# Patient Record
Sex: MALE | Race: WHITE | NOT HISPANIC OR LATINO
[De-identification: names, ages, dates, MRNs, and addresses within clinical notes are randomized per-mention and may not be internally consistent; named-entity substitution may affect disease eponyms.]

---

## 2017-03-01 ENCOUNTER — MEDICATION RENEWAL (OUTPATIENT)
Age: 47
End: 2017-03-01

## 2017-04-03 ENCOUNTER — APPOINTMENT (OUTPATIENT)
Dept: CARDIOLOGY | Facility: CLINIC | Age: 47
End: 2017-04-03

## 2017-04-03 VITALS
DIASTOLIC BLOOD PRESSURE: 80 MMHG | WEIGHT: 259 LBS | HEIGHT: 69 IN | SYSTOLIC BLOOD PRESSURE: 142 MMHG | BODY MASS INDEX: 38.36 KG/M2 | HEART RATE: 88 BPM

## 2017-05-22 ENCOUNTER — APPOINTMENT (OUTPATIENT)
Dept: CARDIOLOGY | Facility: CLINIC | Age: 47
End: 2017-05-22

## 2017-10-02 ENCOUNTER — APPOINTMENT (OUTPATIENT)
Dept: CARDIOLOGY | Facility: CLINIC | Age: 47
End: 2017-10-02

## 2017-10-02 VITALS
DIASTOLIC BLOOD PRESSURE: 80 MMHG | HEART RATE: 87 BPM | WEIGHT: 250 LBS | HEIGHT: 69 IN | BODY MASS INDEX: 37.03 KG/M2 | SYSTOLIC BLOOD PRESSURE: 120 MMHG

## 2018-02-26 ENCOUNTER — RX RENEWAL (OUTPATIENT)
Age: 48
End: 2018-02-26

## 2018-04-23 ENCOUNTER — OTHER (OUTPATIENT)
Age: 48
End: 2018-04-23

## 2018-04-23 ENCOUNTER — RESULT CHARGE (OUTPATIENT)
Age: 48
End: 2018-04-23

## 2018-04-23 ENCOUNTER — APPOINTMENT (OUTPATIENT)
Dept: CARDIOLOGY | Facility: CLINIC | Age: 48
End: 2018-04-23

## 2018-10-22 ENCOUNTER — OTHER (OUTPATIENT)
Age: 48
End: 2018-10-22

## 2018-10-22 ENCOUNTER — RESULT CHARGE (OUTPATIENT)
Age: 48
End: 2018-10-22

## 2018-10-22 ENCOUNTER — APPOINTMENT (OUTPATIENT)
Dept: CARDIOLOGY | Facility: CLINIC | Age: 48
End: 2018-10-22

## 2018-10-22 VITALS
DIASTOLIC BLOOD PRESSURE: 88 MMHG | SYSTOLIC BLOOD PRESSURE: 130 MMHG | HEIGHT: 69 IN | BODY MASS INDEX: 38.51 KG/M2 | WEIGHT: 260 LBS | HEART RATE: 90 BPM

## 2019-04-01 ENCOUNTER — APPOINTMENT (OUTPATIENT)
Dept: CARDIOLOGY | Facility: CLINIC | Age: 49
End: 2019-04-01
Payer: COMMERCIAL

## 2019-04-01 VITALS
HEIGHT: 69 IN | WEIGHT: 270 LBS | DIASTOLIC BLOOD PRESSURE: 80 MMHG | SYSTOLIC BLOOD PRESSURE: 134 MMHG | HEART RATE: 97 BPM | BODY MASS INDEX: 39.99 KG/M2

## 2019-04-01 DIAGNOSIS — I65.29 OCCLUSION AND STENOSIS OF UNSPECIFIED CAROTID ARTERY: ICD-10-CM

## 2019-04-01 PROCEDURE — 93880 EXTRACRANIAL BILAT STUDY: CPT

## 2019-04-01 PROCEDURE — 99214 OFFICE O/P EST MOD 30 MIN: CPT

## 2019-04-01 PROCEDURE — 93000 ELECTROCARDIOGRAM COMPLETE: CPT

## 2019-04-01 RX ORDER — ZINC OXIDE 13 %
CREAM (GRAM) TOPICAL DAILY
Refills: 0 | Status: ACTIVE | COMMUNITY

## 2019-04-01 NOTE — ASSESSMENT
[FreeTextEntry1] : CAD, s/p PCI of LAD with DESIREE.\par Had a sleep test - diagnosed with GEO. Using CPAP, but does not feel refreshed in the morning.\par No angina.\par CT chest noted.\par Diet, pat loss, smoking cessation discussed.\par Secondary prevention discussed.\par Labs reviewed.\par No changes in medical therapy at this time.\par Stress test - schedule.\par Carotid Doppler - f/u results.\par D/w patient and wife in detail\par F/u in 6 months, or if any symptoms.

## 2019-04-01 NOTE — REASON FOR VISIT
[Follow-Up - Clinic] : a clinic follow-up of [Coronary Artery Disease] : coronary artery disease [FreeTextEntry1] : 47 y/o male with history of HTN, DL, CAD, s/p PCI of LAD in February of 2014, presenting for follow-up. The patient reports no chest pain, dyspnea, orthopnea or PND. Unable to loose weight.  Still smokes, but is trying to cut down. Was unable to quit. He had labs done with Dr. Roth - good control of LDL, HDL is still low. Carotid - no obstructive disease. On medical therapy for DM, DL.

## 2019-04-01 NOTE — PHYSICAL EXAM
[General Appearance - Well Developed] : well developed [General Appearance - Well Nourished] : well nourished [General Appearance - In No Acute Distress] : no acute distress [Normal Conjunctiva] : the conjunctiva exhibited no abnormalities [Normal Oral Mucosa] : normal oral mucosa [Normal Oropharynx] : normal oropharynx [Normal Jugular Venous V Waves Present] : normal jugular venous V waves present [] : no respiratory distress [Respiration, Rhythm And Depth] : normal respiratory rhythm and effort [Auscultation Breath Sounds / Voice Sounds] : lungs were clear to auscultation bilaterally [Heart Rate And Rhythm] : heart rate and rhythm were normal [Heart Sounds] : normal S1 and S2 [Murmurs] : no murmurs present [Arterial Pulses Normal] : the arterial pulses were normal [Edema] : no peripheral edema present [Bowel Sounds] : normal bowel sounds [Abdomen Soft] : soft [Abdomen Tenderness] : non-tender [Abnormal Walk] : normal gait [Nail Clubbing] : no clubbing of the fingernails [Cyanosis, Localized] : no localized cyanosis [Skin Color & Pigmentation] : normal skin color and pigmentation [Oriented To Time, Place, And Person] : oriented to person, place, and time [Affect] : the affect was normal [FreeTextEntry1] : overweight

## 2019-04-02 PROBLEM — I65.29 OCCLUSION AND STENOSIS OF UNSPECIFIED CAROTID ARTERY: Status: ACTIVE | Noted: 2019-04-02

## 2019-05-13 ENCOUNTER — APPOINTMENT (OUTPATIENT)
Dept: CARDIOLOGY | Facility: CLINIC | Age: 49
End: 2019-05-13
Payer: COMMERCIAL

## 2019-05-13 PROCEDURE — 93015 CV STRESS TEST SUPVJ I&R: CPT

## 2019-10-21 ENCOUNTER — APPOINTMENT (OUTPATIENT)
Dept: CARDIOLOGY | Facility: CLINIC | Age: 49
End: 2019-10-21
Payer: COMMERCIAL

## 2019-10-21 VITALS
HEIGHT: 69 IN | WEIGHT: 274 LBS | SYSTOLIC BLOOD PRESSURE: 128 MMHG | DIASTOLIC BLOOD PRESSURE: 86 MMHG | HEART RATE: 101 BPM | BODY MASS INDEX: 40.58 KG/M2

## 2019-10-21 PROCEDURE — 93000 ELECTROCARDIOGRAM COMPLETE: CPT

## 2019-10-21 PROCEDURE — 99214 OFFICE O/P EST MOD 30 MIN: CPT

## 2019-10-21 NOTE — ASSESSMENT
[FreeTextEntry1] : CAD, s/p PCI of LAD with DESIREE.\par Had a sleep test - diagnosed with EGO. Using CPAP.\par No angina.\par CT chest noted. Follow-up with pulmonary, ENT.\par Diet, pat loss, smoking cessation discussed.\par Secondary prevention discussed.\par Labs reviewed.\par No changes in medical therapy at this time.\par Stress test - no ischemia at 9 min\par D/w patient and wife in detail\par F/u in 6 months, or if any symptoms.

## 2019-10-21 NOTE — REASON FOR VISIT
[Follow-Up - Clinic] : a clinic follow-up of [FreeTextEntry1] : 48 y/o male with history of HTN, DL, CAD, s/p PCI of LAD in February of 2014, presenting for follow-up. The patient reports no chest pain, does have some dyspnea, no orthopnea or PND. Unable to loose weight, actually gained.  Still smokes, but is trying to cut down. Was unable to quit. He had labs done with Dr. Roth. Was seen by pulmonary, was diagnosed with GEO, started on CPAP. Also has interstitial lung disease on CT chest, being w/u for interstitial pneumonitis. Following with pulmonary, has appointment with ENT for possible T&A. Will have a trial of steroids for his lung disease. [Coronary Artery Disease] : coronary artery disease

## 2019-10-21 NOTE — PHYSICAL EXAM
[General Appearance - Well Developed] : well developed [General Appearance - Well Nourished] : well nourished [General Appearance - In No Acute Distress] : no acute distress [FreeTextEntry1] : overweight [Normal Jugular Venous V Waves Present] : normal jugular venous V waves present [Normal Oropharynx] : normal oropharynx [Normal Oral Mucosa] : normal oral mucosa [Normal Conjunctiva] : the conjunctiva exhibited no abnormalities [Murmurs] : no murmurs present [Heart Sounds] : normal S1 and S2 [Heart Rate And Rhythm] : heart rate and rhythm were normal [] : no respiratory distress [Edema] : no peripheral edema present [Arterial Pulses Normal] : the arterial pulses were normal [Auscultation Breath Sounds / Voice Sounds] : lungs were clear to auscultation bilaterally [Respiration, Rhythm And Depth] : normal respiratory rhythm and effort [Bowel Sounds] : normal bowel sounds [Abdomen Soft] : soft [Abdomen Tenderness] : non-tender [Cyanosis, Localized] : no localized cyanosis [Nail Clubbing] : no clubbing of the fingernails [Abnormal Walk] : normal gait [Skin Color & Pigmentation] : normal skin color and pigmentation [Oriented To Time, Place, And Person] : oriented to person, place, and time [Affect] : the affect was normal

## 2020-06-15 ENCOUNTER — APPOINTMENT (OUTPATIENT)
Dept: CARDIOLOGY | Facility: CLINIC | Age: 50
End: 2020-06-15
Payer: COMMERCIAL

## 2020-06-15 VITALS
BODY MASS INDEX: 41.92 KG/M2 | HEART RATE: 89 BPM | WEIGHT: 283 LBS | DIASTOLIC BLOOD PRESSURE: 82 MMHG | HEIGHT: 69 IN | SYSTOLIC BLOOD PRESSURE: 128 MMHG

## 2020-06-15 PROCEDURE — 93000 ELECTROCARDIOGRAM COMPLETE: CPT

## 2020-06-15 PROCEDURE — 99213 OFFICE O/P EST LOW 20 MIN: CPT

## 2020-06-15 NOTE — REASON FOR VISIT
[Follow-Up - Clinic] : a clinic follow-up of [Coronary Artery Disease] : coronary artery disease [FreeTextEntry1] : 48 y/o male with history of HTN, DL, CAD, s/p PCI of LAD in February of 2014, presenting for follow-up. The patient reports no chest pain, does have some dyspnea, no orthopnea or PND. Unable to loose weight, actually gained.  Still smokes, but is trying to cut down. Was unable to quit.  Was seen by pulmonary, was diagnosed with GEO, started on CPAP. Also has interstitial lung disease on CT chest, being w/u for interstitial pneumonitis. Following with pulmonary.

## 2020-06-15 NOTE — PHYSICAL EXAM
[General Appearance - Well Developed] : well developed [General Appearance - Well Nourished] : well nourished [General Appearance - In No Acute Distress] : no acute distress [FreeTextEntry1] : overweight [Normal Conjunctiva] : the conjunctiva exhibited no abnormalities [Normal Oral Mucosa] : normal oral mucosa [Normal Oropharynx] : normal oropharynx [Normal Jugular Venous V Waves Present] : normal jugular venous V waves present [] : no respiratory distress [Respiration, Rhythm And Depth] : normal respiratory rhythm and effort [Auscultation Breath Sounds / Voice Sounds] : lungs were clear to auscultation bilaterally [Heart Rate And Rhythm] : heart rate and rhythm were normal [Heart Sounds] : normal S1 and S2 [Murmurs] : no murmurs present [Arterial Pulses Normal] : the arterial pulses were normal [Edema] : no peripheral edema present [Bowel Sounds] : normal bowel sounds [Abdomen Soft] : soft [Abdomen Tenderness] : non-tender [Abnormal Walk] : normal gait [Nail Clubbing] : no clubbing of the fingernails [Cyanosis, Localized] : no localized cyanosis [Skin Color & Pigmentation] : normal skin color and pigmentation [Oriented To Time, Place, And Person] : oriented to person, place, and time [Affect] : the affect was normal

## 2020-06-15 NOTE — ASSESSMENT
[FreeTextEntry1] : CAD, s/p PCI of LAD with DESIREE.\par Had a sleep test - diagnosed with GEO. Using CPAP.\par No angina.\par CT chest noted. Follow-up with pulmonary, ENT.\par Diet, pat loss, smoking cessation discussed.\par Prescribe Chantix.\par Secondary prevention discussed.\par Labs reviewed.\par No changes in medical therapy at this time.\par Stress test - no ischemia at 9 min\par F/u in 6 months, or if any symptoms.

## 2020-06-21 ENCOUNTER — INPATIENT (INPATIENT)
Facility: HOSPITAL | Age: 50
LOS: 1 days | Discharge: HOME | End: 2020-06-23
Attending: INTERNAL MEDICINE | Admitting: INTERNAL MEDICINE
Payer: COMMERCIAL

## 2020-06-21 VITALS
DIASTOLIC BLOOD PRESSURE: 79 MMHG | HEART RATE: 78 BPM | HEIGHT: 69 IN | WEIGHT: 274.92 LBS | RESPIRATION RATE: 20 BRPM | TEMPERATURE: 99 F | SYSTOLIC BLOOD PRESSURE: 163 MMHG | OXYGEN SATURATION: 96 %

## 2020-06-21 DIAGNOSIS — I21.9 ACUTE MYOCARDIAL INFARCTION, UNSPECIFIED: Chronic | ICD-10-CM

## 2020-06-21 DIAGNOSIS — R09.89 OTHER SPECIFIED SYMPTOMS AND SIGNS INVOLVING THE CIRCULATORY AND RESPIRATORY SYSTEMS: ICD-10-CM

## 2020-06-21 DIAGNOSIS — Z90.49 ACQUIRED ABSENCE OF OTHER SPECIFIED PARTS OF DIGESTIVE TRACT: Chronic | ICD-10-CM

## 2020-06-21 DIAGNOSIS — Z95.5 PRESENCE OF CORONARY ANGIOPLASTY IMPLANT AND GRAFT: Chronic | ICD-10-CM

## 2020-06-21 LAB
ALBUMIN SERPL ELPH-MCNC: 4.3 G/DL — SIGNIFICANT CHANGE UP (ref 3.5–5.2)
ALP SERPL-CCNC: 139 U/L — HIGH (ref 30–115)
ALT FLD-CCNC: 67 U/L — HIGH (ref 0–41)
ANION GAP SERPL CALC-SCNC: 8 MMOL/L — SIGNIFICANT CHANGE UP (ref 7–14)
AST SERPL-CCNC: 65 U/L — HIGH (ref 0–41)
BASOPHILS # BLD AUTO: 0.06 K/UL — SIGNIFICANT CHANGE UP (ref 0–0.2)
BASOPHILS NFR BLD AUTO: 0.6 % — SIGNIFICANT CHANGE UP (ref 0–1)
BILIRUB SERPL-MCNC: 0.5 MG/DL — SIGNIFICANT CHANGE UP (ref 0.2–1.2)
BUN SERPL-MCNC: 13 MG/DL — SIGNIFICANT CHANGE UP (ref 10–20)
CALCIUM SERPL-MCNC: 9.8 MG/DL — SIGNIFICANT CHANGE UP (ref 8.5–10.1)
CHLORIDE SERPL-SCNC: 103 MMOL/L — SIGNIFICANT CHANGE UP (ref 98–110)
CK MB CFR SERPL CALC: 16.8 NG/ML — HIGH (ref 0.6–6.3)
CK SERPL-CCNC: 262 U/L — HIGH (ref 0–225)
CO2 SERPL-SCNC: 27 MMOL/L — SIGNIFICANT CHANGE UP (ref 17–32)
CREAT SERPL-MCNC: 0.8 MG/DL — SIGNIFICANT CHANGE UP (ref 0.7–1.5)
D DIMER BLD IA.RAPID-MCNC: 116 NG/ML DDU — SIGNIFICANT CHANGE UP (ref 0–230)
EOSINOPHIL # BLD AUTO: 0.24 K/UL — SIGNIFICANT CHANGE UP (ref 0–0.7)
EOSINOPHIL NFR BLD AUTO: 2.5 % — SIGNIFICANT CHANGE UP (ref 0–8)
GLUCOSE BLDC GLUCOMTR-MCNC: 142 MG/DL — HIGH (ref 70–99)
GLUCOSE SERPL-MCNC: 117 MG/DL — HIGH (ref 70–99)
HCT VFR BLD CALC: 46.2 % — SIGNIFICANT CHANGE UP (ref 42–52)
HGB BLD-MCNC: 15.6 G/DL — SIGNIFICANT CHANGE UP (ref 14–18)
IMM GRANULOCYTES NFR BLD AUTO: 0.3 % — SIGNIFICANT CHANGE UP (ref 0.1–0.3)
LYMPHOCYTES # BLD AUTO: 3.44 K/UL — HIGH (ref 1.2–3.4)
LYMPHOCYTES # BLD AUTO: 35.5 % — SIGNIFICANT CHANGE UP (ref 20.5–51.1)
MAGNESIUM SERPL-MCNC: 1.9 MG/DL — SIGNIFICANT CHANGE UP (ref 1.8–2.4)
MCHC RBC-ENTMCNC: 29.4 PG — SIGNIFICANT CHANGE UP (ref 27–31)
MCHC RBC-ENTMCNC: 33.8 G/DL — SIGNIFICANT CHANGE UP (ref 32–37)
MCV RBC AUTO: 87 FL — SIGNIFICANT CHANGE UP (ref 80–94)
MONOCYTES # BLD AUTO: 0.68 K/UL — HIGH (ref 0.1–0.6)
MONOCYTES NFR BLD AUTO: 7 % — SIGNIFICANT CHANGE UP (ref 1.7–9.3)
NEUTROPHILS # BLD AUTO: 5.23 K/UL — SIGNIFICANT CHANGE UP (ref 1.4–6.5)
NEUTROPHILS NFR BLD AUTO: 54.1 % — SIGNIFICANT CHANGE UP (ref 42.2–75.2)
NRBC # BLD: 0 /100 WBCS — SIGNIFICANT CHANGE UP (ref 0–0)
NT-PROBNP SERPL-SCNC: 42 PG/ML — SIGNIFICANT CHANGE UP (ref 0–300)
PLATELET # BLD AUTO: 249 K/UL — SIGNIFICANT CHANGE UP (ref 130–400)
POTASSIUM SERPL-MCNC: 4.5 MMOL/L — SIGNIFICANT CHANGE UP (ref 3.5–5)
POTASSIUM SERPL-SCNC: 4.5 MMOL/L — SIGNIFICANT CHANGE UP (ref 3.5–5)
PROT SERPL-MCNC: 6.2 G/DL — SIGNIFICANT CHANGE UP (ref 6–8)
RBC # BLD: 5.31 M/UL — SIGNIFICANT CHANGE UP (ref 4.7–6.1)
RBC # FLD: 13.1 % — SIGNIFICANT CHANGE UP (ref 11.5–14.5)
SARS-COV-2 RNA SPEC QL NAA+PROBE: SIGNIFICANT CHANGE UP
SODIUM SERPL-SCNC: 138 MMOL/L — SIGNIFICANT CHANGE UP (ref 135–146)
TROPONIN T SERPL-MCNC: 0.09 NG/ML — CRITICAL HIGH
TROPONIN T SERPL-MCNC: <0.01 NG/ML — SIGNIFICANT CHANGE UP
WBC # BLD: 9.68 K/UL — SIGNIFICANT CHANGE UP (ref 4.8–10.8)
WBC # FLD AUTO: 9.68 K/UL — SIGNIFICANT CHANGE UP (ref 4.8–10.8)

## 2020-06-21 PROCEDURE — 99223 1ST HOSP IP/OBS HIGH 75: CPT

## 2020-06-21 PROCEDURE — 93970 EXTREMITY STUDY: CPT | Mod: 26

## 2020-06-21 PROCEDURE — 71046 X-RAY EXAM CHEST 2 VIEWS: CPT | Mod: 26

## 2020-06-21 PROCEDURE — 76705 ECHO EXAM OF ABDOMEN: CPT | Mod: 26

## 2020-06-21 PROCEDURE — 99285 EMERGENCY DEPT VISIT HI MDM: CPT

## 2020-06-21 RX ORDER — VITAMIN E 100 UNIT
400 CAPSULE ORAL DAILY
Refills: 0 | Status: DISCONTINUED | OUTPATIENT
Start: 2020-06-21 | End: 2020-06-23

## 2020-06-21 RX ORDER — LORATADINE 10 MG/1
1 TABLET ORAL
Qty: 0 | Refills: 0 | DISCHARGE

## 2020-06-21 RX ORDER — METOPROLOL TARTRATE 50 MG
0 TABLET ORAL
Qty: 0 | Refills: 0 | DISCHARGE

## 2020-06-21 RX ORDER — NITROGLYCERIN 6.5 MG
0.4 CAPSULE, EXTENDED RELEASE ORAL ONCE
Refills: 0 | Status: COMPLETED | OUTPATIENT
Start: 2020-06-21 | End: 2020-06-21

## 2020-06-21 RX ORDER — ATORVASTATIN CALCIUM 80 MG/1
0 TABLET, FILM COATED ORAL
Qty: 0 | Refills: 0 | DISCHARGE

## 2020-06-21 RX ORDER — PANTOPRAZOLE SODIUM 20 MG/1
40 TABLET, DELAYED RELEASE ORAL
Refills: 0 | Status: DISCONTINUED | OUTPATIENT
Start: 2020-06-21 | End: 2020-06-23

## 2020-06-21 RX ORDER — METOPROLOL TARTRATE 50 MG
50 TABLET ORAL DAILY
Refills: 0 | Status: DISCONTINUED | OUTPATIENT
Start: 2020-06-21 | End: 2020-06-23

## 2020-06-21 RX ORDER — MORPHINE SULFATE 50 MG/1
2 CAPSULE, EXTENDED RELEASE ORAL ONCE
Refills: 0 | Status: DISCONTINUED | OUTPATIENT
Start: 2020-06-21 | End: 2020-06-21

## 2020-06-21 RX ORDER — HEPARIN SODIUM 5000 [USP'U]/ML
5000 INJECTION INTRAVENOUS; SUBCUTANEOUS ONCE
Refills: 0 | Status: COMPLETED | OUTPATIENT
Start: 2020-06-21 | End: 2020-06-21

## 2020-06-21 RX ORDER — ATORVASTATIN CALCIUM 80 MG/1
40 TABLET, FILM COATED ORAL AT BEDTIME
Refills: 0 | Status: DISCONTINUED | OUTPATIENT
Start: 2020-06-21 | End: 2020-06-23

## 2020-06-21 RX ORDER — METOPROLOL TARTRATE 50 MG
1 TABLET ORAL
Qty: 0 | Refills: 0 | DISCHARGE

## 2020-06-21 RX ORDER — NITROGLYCERIN 6.5 MG
5 CAPSULE, EXTENDED RELEASE ORAL
Qty: 50 | Refills: 0 | Status: DISCONTINUED | OUTPATIENT
Start: 2020-06-21 | End: 2020-06-22

## 2020-06-21 RX ORDER — CLOPIDOGREL BISULFATE 75 MG/1
300 TABLET, FILM COATED ORAL ONCE
Refills: 0 | Status: COMPLETED | OUTPATIENT
Start: 2020-06-21 | End: 2020-06-21

## 2020-06-21 RX ORDER — ASPIRIN/CALCIUM CARB/MAGNESIUM 324 MG
81 TABLET ORAL DAILY
Refills: 0 | Status: DISCONTINUED | OUTPATIENT
Start: 2020-06-21 | End: 2020-06-23

## 2020-06-21 RX ORDER — ATORVASTATIN CALCIUM 80 MG/1
1 TABLET, FILM COATED ORAL
Qty: 0 | Refills: 0 | DISCHARGE

## 2020-06-21 RX ORDER — CHLORHEXIDINE GLUCONATE 213 G/1000ML
1 SOLUTION TOPICAL EVERY 24 HOURS
Refills: 0 | Status: DISCONTINUED | OUTPATIENT
Start: 2020-06-21 | End: 2020-06-23

## 2020-06-21 RX ORDER — ASPIRIN/CALCIUM CARB/MAGNESIUM 324 MG
0 TABLET ORAL
Qty: 0 | Refills: 0 | DISCHARGE

## 2020-06-21 RX ORDER — CLOPIDOGREL BISULFATE 75 MG/1
75 TABLET, FILM COATED ORAL DAILY
Refills: 0 | Status: DISCONTINUED | OUTPATIENT
Start: 2020-06-22 | End: 2020-06-22

## 2020-06-21 RX ORDER — ASCORBIC ACID 60 MG
500 TABLET,CHEWABLE ORAL DAILY
Refills: 0 | Status: DISCONTINUED | OUTPATIENT
Start: 2020-06-21 | End: 2020-06-23

## 2020-06-21 RX ORDER — CLOPIDOGREL BISULFATE 75 MG/1
0 TABLET, FILM COATED ORAL
Qty: 0 | Refills: 0 | DISCHARGE

## 2020-06-21 RX ORDER — ENOXAPARIN SODIUM 100 MG/ML
40 INJECTION SUBCUTANEOUS DAILY
Refills: 0 | Status: DISCONTINUED | OUTPATIENT
Start: 2020-06-21 | End: 2020-06-21

## 2020-06-21 RX ORDER — MULTIVIT-MIN/FERROUS GLUCONATE 9 MG/15 ML
1 LIQUID (ML) ORAL DAILY
Refills: 0 | Status: DISCONTINUED | OUTPATIENT
Start: 2020-06-21 | End: 2020-06-23

## 2020-06-21 RX ORDER — ASPIRIN/CALCIUM CARB/MAGNESIUM 324 MG
1 TABLET ORAL
Qty: 0 | Refills: 0 | DISCHARGE

## 2020-06-21 RX ORDER — FLUTICASONE PROPIONATE 220 MCG
1 AEROSOL WITH ADAPTER (GRAM) INHALATION
Qty: 0 | Refills: 0 | DISCHARGE

## 2020-06-21 RX ORDER — CLOPIDOGREL BISULFATE 75 MG/1
75 TABLET, FILM COATED ORAL DAILY
Refills: 0 | Status: DISCONTINUED | OUTPATIENT
Start: 2020-06-21 | End: 2020-06-21

## 2020-06-21 RX ORDER — CHOLECALCIFEROL (VITAMIN D3) 125 MCG
2000 CAPSULE ORAL DAILY
Refills: 0 | Status: DISCONTINUED | OUTPATIENT
Start: 2020-06-21 | End: 2020-06-23

## 2020-06-21 RX ORDER — LORATADINE 10 MG/1
10 TABLET ORAL DAILY
Refills: 0 | Status: DISCONTINUED | OUTPATIENT
Start: 2020-06-21 | End: 2020-06-23

## 2020-06-21 RX ORDER — HEPARIN SODIUM 5000 [USP'U]/ML
1000 INJECTION INTRAVENOUS; SUBCUTANEOUS
Qty: 25000 | Refills: 0 | Status: DISCONTINUED | OUTPATIENT
Start: 2020-06-21 | End: 2020-06-22

## 2020-06-21 RX ORDER — ASPIRIN/CALCIUM CARB/MAGNESIUM 324 MG
243 TABLET ORAL ONCE
Refills: 0 | Status: COMPLETED | OUTPATIENT
Start: 2020-06-21 | End: 2020-06-21

## 2020-06-21 RX ORDER — UBIDECARENONE 100 MG
1 CAPSULE ORAL
Qty: 0 | Refills: 0 | DISCHARGE

## 2020-06-21 RX ORDER — MULTIVIT-MIN/FERROUS GLUCONATE 9 MG/15 ML
1 LIQUID (ML) ORAL
Qty: 0 | Refills: 0 | DISCHARGE

## 2020-06-21 RX ADMIN — HEPARIN SODIUM 5000 UNIT(S): 5000 INJECTION INTRAVENOUS; SUBCUTANEOUS at 18:11

## 2020-06-21 RX ADMIN — ATORVASTATIN CALCIUM 40 MILLIGRAM(S): 80 TABLET, FILM COATED ORAL at 21:16

## 2020-06-21 RX ADMIN — Medication 1.5 MICROGRAM(S)/MIN: at 19:30

## 2020-06-21 RX ADMIN — HEPARIN SODIUM 10 UNIT(S)/HR: 5000 INJECTION INTRAVENOUS; SUBCUTANEOUS at 18:11

## 2020-06-21 RX ADMIN — Medication 243 MILLIGRAM(S): at 10:58

## 2020-06-21 RX ADMIN — Medication 0.4 MILLIGRAM(S): at 17:01

## 2020-06-21 RX ADMIN — CLOPIDOGREL BISULFATE 300 MILLIGRAM(S): 75 TABLET, FILM COATED ORAL at 18:41

## 2020-06-21 RX ADMIN — Medication 0.4 MILLIGRAM(S): at 12:21

## 2020-06-21 RX ADMIN — MORPHINE SULFATE 2 MILLIGRAM(S): 50 CAPSULE, EXTENDED RELEASE ORAL at 23:31

## 2020-06-21 NOTE — H&P ADULT - ASSESSMENT
49 year old male patient with past medical history of coronary artery disease s/p stent in 2014, hyperlipidemia, obstructive sleep apnea on CPAP at home, hypersensitivity pneumonitis work-related (works as a ), presenting from for chest pain     # Typical chest pain in a patient with known coronary artery disease who underwent stent placement in 2014, rule out unstable angina / NSTEMI   - Trop T negative on admission, continue to trend   - ECG on admission with no changes, continue to trend   - Monitor on Telemetry for now   - Nitroglycerin 0.4 mg sublingual PRN for chest pain   - Continue Aspirin 81 mg PO qd + Plavix 75 mg PO qd + Lipitor 40 mg qhs + Toprol 50 mg PO qd   - Check Lipid panel and A1C   - Check D-dimers and bilateral lower extremity venous doppler given recent inactivity, patient obese with GEO and reporting left lower extremity edema   - Cardiology following, possible cath tomorrow   - Check echocardiogram     # Obstructive sleep apnea   - Continue CPAP at night   - Re-enforce importance of weight loss     # Hypersensitivity pneumonitis   - Diagnosed 1 year and a half ago, likely work related as patient is a , completed course of Prednisone in the past, underwent repeat CT scan of the chest for re-assessment results not known   - Follow-up outpatient with pulmonary     # Hyperlipidemia   - Continue Lipitor 40 mg PO qhs     # Elevated transaminase   - Likely BIRD vs statin induced   - Check abdominal US   - Monitor LFTs   - Suggest outpatient monitoring and workup      # Miscellaneous   - DVT prophylaxis : Lovenox 40 mg sq qd   - GI prophylaxis : Protonix 40 mg PO qd   - Activity : Increase as tolerated   - Diet : DASH TLC   - Code status : Full code

## 2020-06-21 NOTE — ED ADULT NURSE NOTE - NSIMPLEMENTINTERV_GEN_ALL_ED
Implemented All Universal Safety Interventions:  [place] to call system. Call bell, personal items and telephone within reach. Instruct patient to call for assistance. Room bathroom lighting operational. Non-slip footwear when patient is off stretcher. Physically safe environment: no spills, clutter or unnecessary equipment. Stretcher in lowest position, wheels locked, appropriate side rails in place.

## 2020-06-21 NOTE — CONSULT NOTE ADULT - SUBJECTIVE AND OBJECTIVE BOX
Date of Admission: 6/21/2020    CHIEF COMPLAINT: Chest pain    HISTORY OF PRESENT ILLNESS: 49 year old male patient with past medical history of coronary artery disease s/p stent in 2014, hyperlipidemia, obstructive sleep apnea on CPAP at home, hypersensitivity pneumonitis work-related (works as a ), presenting from for chest pain   The patient states that his pain started yesterday evening at home while he was watching the television, the pain is described as pressure sensation, radiates to the back and to the left upper extremity, and is constant. The patient states that he was able to sleep through the pain however 2 hours after he woke up this morning his pain worsened and was rated as 7 - 8 / 10 at the time. The pain is worse on exertion , associated with dyspnea and is improved at rest. He denies any associated fever /; chills, cough, sputum production, palpitations or dizziness. He states that for 2 weeks he noticed that his left lower extremity was more swollen than his right lower extremity but denies any erythema or pain at the site.   The patient had a stent placed in 2014 and had a treadmill stress test done last year in May, which was normal, and since then has not experienced any chest pain or angina.     In the ED : /79, HR 78, RR 20, Temp 98.8, SpO2 96 % on room air. Troponin T negative and ECG with no ischemic changes, the patient was transferred to the Located within [facility] given strong suspicion for acute coronary syndrome.      PAST MEDICAL & SURGICAL HISTORY:  Hypersensitivity pneumonitis  Obstructive sleep apnea  Hyperlipidemia  Coronary artery disease: s/p Stent in 2014  History of appendectomy  H/O heart artery stent      FAMILY HISTORY:  [x] no pertinent family history of premature cardiovascular disease in first degree relatives.  Mother:   Father:   Siblings:     SOCIAL HISTORY:    [ ] Non-smoker  [x] Smoker. current everyday smoker  [ ] Alcohol    Allergies    No Known Allergies    Intolerances    	    REVIEW OF SYSTEMS:  CONSTITUTIONAL: denies fever, weight loss, or fatigue  CARDIOLOGY: see HPI   RESPIRATORY: denies shortness of breath, wheezing.   NEUROLOGICAL: denies weakness, no focal deficits to report.  ENDOCRINOLOGICAL: no recent change in diabetic medications.   GI: no BRBPR, no N,V, diarrhea.    PSYCHIATRY: normal mood and affect  HEENT: no nasal discharge, no ecchymosis  SKIN: no ecchymosis, no breakdown  MUSCULOSKELETAL: Full range of motion x4.     PHYSICAL EXAM:  T(C): 35.8 (06-21-20 @ 15:38), Max: 37.1 (06-21-20 @ 10:20)  HR: 63 (06-21-20 @ 17:00) (63 - 83)  BP: 139/67 (06-21-20 @ 17:00) (125/58 - 171/90)  RR: 18 (06-21-20 @ 15:38) (18 - 20)  SpO2: 95% (06-21-20 @ 15:45) (95% - 96%)  Wt(kg): --  I&O's Summary      General Appearance: well appearing, normal for age and gender. 	  Neck: normal JVP, no bruit.   Eyes: No xanthomalasia, Extra Ocular muscles intact.   Cardiovascular: regular rate and rhythm S1 S2, No JVD, No murmurs, No edema  Respiratory: Lungs clear to auscultation	  Psychiatry: Alert and oriented x 3, Mood & affect appropriate  Gastrointestinal:  Soft, Non-tender  Skin/Integumen: No rashes, No ecchymoses, No cyanosis	  Neurologic: Non-focal  Musculoskeletal/extremities: Normal range of motion, No clubbing, cyanosis or edema  Vascular: Peripheral pulses palpable 2+ bilaterally    LABS:	 	                          15.6   9.68  )-----------( 249      ( 21 Jun 2020 10:39 )             46.2     06-21    138  |  103  |  13  ----------------------------<  117<H>  4.5   |  27  |  0.8    Ca    9.8      21 Jun 2020 10:39  Mg     1.9     06-21    TPro  6.2  /  Alb  4.3  /  TBili  0.5  /  DBili  x   /  AST  65<H>  /  ALT  67<H>  /  AlkPhos  139<H>  06-21    CARDIAC MARKERS ( 21 Jun 2020 17:04 )  x     / 0.09 ng/mL / 262 U/L / x     / 16.8 ng/mL  CARDIAC MARKERS ( 21 Jun 2020 10:39 )  x     / <0.01 ng/mL / x     / x     / x              CARDIAC MARKERS:        TELEMETRY EVENTS: 	no events    ECG:  	Sinus rhythm   RADIOLOGY:  < from: Xray Chest 2 Views PA/Lat (06.21.20 @ 10:54) >  Impression:      No radiographic evidence of acute cardiopulmonary disease.    < end of copied text >    OTHER: 	    PREVIOUS DIAGNOSTIC TESTING:    [ ] Echocardiogram: 2014  EF 55-65%  [ ] Catheterization: 2014  95 % stenosis LAD s/p DESIREE    	    Home Medications:  Aspirin Enteric Coated 81 mg oral delayed release tablet: 1 tab(s) orally once a day (21 Jun 2020 17:19)  atorvastatin 40 mg oral tablet: 1 tab(s) orally once a day (21 Jun 2020 17:19)  CoQ10 300 mg oral capsule: 1 cap(s) orally once a day (21 Jun 2020 17:19)  Daily Multiple for Men 50+ oral tablet: 1 tab(s) orally once a day (21 Jun 2020 17:19)  Fish Oil 1000 mg oral capsule: 1 cap(s) orally once a day (21 Jun 2020 17:19)  fluticasone 50 mcg/inh inhalation powder: 1 puff(s) inhaled 2 times a day (21 Jun 2020 17:19)  glucosamine hydrochloride 1500 mg oral tablet: 1 tab(s) orally 2 times a day (21 Jun 2020 17:19)  loratadine 10 mg oral tablet: 1 tab(s) orally once a day (21 Jun 2020 17:19)  Metoprolol Succinate ER 50 mg oral tablet, extended release: 1 tab(s) orally once a day (21 Jun 2020 17:19)  Plavix 75 mg oral tablet: 1 tab(s) orally once a day (21 Jun 2020 17:19)  Vitamin C 1000 mg oral tablet: 1 tab(s) orally once a day (21 Jun 2020 17:19)  Vitamin D3 2000 intl units (50 mcg) oral tablet: 1 tab(s) orally once a day (21 Jun 2020 17:19)  vitamin E 400 intl units oral capsule: 1 cap(s) orally once a day (21 Jun 2020 17:19)    MEDICATIONS  (STANDING):  ascorbic acid 500 milliGRAM(s) Oral daily  aspirin enteric coated 81 milliGRAM(s) Oral daily  atorvastatin 40 milliGRAM(s) Oral at bedtime  cholecalciferol 2000 Unit(s) Oral daily  clopidogrel Tablet 300 milliGRAM(s) Oral once  heparin   Injectable 5000 Unit(s) IV Push once  heparin  Infusion 1000 Unit(s)/Hr (10 mL/Hr) IV Continuous <Continuous>  loratadine 10 milliGRAM(s) Oral daily  metoprolol succinate ER 50 milliGRAM(s) Oral daily  multivitamin/minerals 1 Tablet(s) Oral daily  pantoprazole    Tablet 40 milliGRAM(s) Oral before breakfast  vitamin E 400 International Unit(s) Oral daily    MEDICATIONS  (PRN):

## 2020-06-21 NOTE — ED PROVIDER NOTE - PHYSICAL EXAMINATION
GEN: Alert & Oriented x 3, No acute distress. Calm, appropriate.  Head and Neck: Normocephalic, atraumatic.   Eyes: PERRL. No conjunctival injection. No scleral icterus.   RESP: Lungs clear to auscult bilat. no wheezes, rhonchi or rales. No retractions. Equal air entry.  CARDIO: regular rate and rhythm, no murmurs, rubs or gallops. Normal S1, S2.  Radial pulses 2+ bilaterally. No lower extremity edema.  ABD: Soft, distended. No rebound tenderness/guarding. No pulsatile mass. No tenderness with palpation x 4 quadrants.  MS: grossly moving all ext. no calf tenderness.  SKIN: no rashes/lesions, no petechiae, no ecchymosis.  NEURO: CN II-XII grossly intact. Strength + sensation intact x 4 extremities. Speech and cognition normal.

## 2020-06-21 NOTE — ED PROVIDER NOTE - CLINICAL SUMMARY MEDICAL DECISION MAKING FREE TEXT BOX
49y male above PMH with CP radiating to left bicep similar to prior ACS but less severe, on exam vital signs appreciated, head nc/at, perrla, EOMI, conj pink op clear neck supple cor rrr lungs cta abd snt +trace bipedal edema pulses equal calves nontender neuro intact, labs and studies reviewed and d/w Dr Brink who wants pt [place] for possible cath

## 2020-06-21 NOTE — H&P ADULT - NSICDXPASTMEDICALHX_GEN_ALL_CORE_FT
PAST MEDICAL HISTORY:  Coronary artery disease s/p Stent in 2014    Hyperlipidemia     Hypersensitivity pneumonitis     Obstructive sleep apnea

## 2020-06-21 NOTE — CONSULT NOTE ADULT - ASSESSMENT
Patient is a 49y old  Male who presents with a chief complaint of typical angina at rest.     NSTEMI  CAD s/p DESIREE to LAD in 2014   Hypersensitivity pneumonitis  Obstructive sleep apnea on CPAP  Hyperlipidemia    Recommendations:   Upgrade to CCU   Nitro drip for pain  Heparin drip, ACS protocol, monitor PTT   Trend CE, AST/ALT  Plavix 600 mg STAT,  mg given   c/w DAPT, lipitor and metoprolol   CPAP qHS   NPO after midnight for LHC tomorrow Patient is a 49y old  Male who presents with a chief complaint of typical angina at rest.     NSTEMI  CAD s/p DESIREE to LAD in 2014   Hypersensitivity pneumonitis  Obstructive sleep apnea on CPAP  Hyperlipidemia    Recommendations:   Upgrade to CCU   Nitro drip for pain  Heparin drip, ACS protocol, monitor PTT   Trend CE, AST/ALT  Plavix 600 mg STAT,  mg given   c/w DAPT, lipitor and metoprolol   TTE  Check HbA1C and lipid panel  Smoking cessation  CPAP qHS   NPO after midnight for LHC tomorrow

## 2020-06-21 NOTE — H&P ADULT - NSHPPHYSICALEXAM_GEN_ALL_CORE
ICU Vital Signs Last 24 Hrs  T(C): 35.8 (21 Jun 2020 15:38), Max: 37.1 (21 Jun 2020 10:20)  T(F): 96.4 (21 Jun 2020 15:38), Max: 98.8 (21 Jun 2020 10:20)  HR: 63 (21 Jun 2020 17:00) (63 - 83)  BP: 139/67 (21 Jun 2020 17:00) (125/58 - 171/90)  BP(mean): --  ABP: --  ABP(mean): --  RR: 18 (21 Jun 2020 15:38) (18 - 20)  SpO2: 95% (21 Jun 2020 15:45) (95% - 96%)    General : No acute distress, alert and oriented, states his chest pain is better and is currently 2 - 3 / 10   Lungs : Clear to auscultation bilaterally, no crackles rhonchi or wheezing, on room air with no signs of respiratory distress   Cardiovascular : Regular S1S2   Abdomen : soft nontender nondistended   Extremities : Mild left lower extremity edema, no tenderness on palpation of the calves bilaterally, no erythema

## 2020-06-21 NOTE — ED PROVIDER NOTE - PROGRESS NOTE DETAILS
Dr. Xie spoke with Dr. Bolton, wants admitted [place] to [facility]. Spoke with Dr. Rosas (hospitalist) will admit north. spoke with MAR and signed out.

## 2020-06-21 NOTE — ED PROVIDER NOTE - OBJECTIVE STATEMENT
The pt is a 49y M with PMH MI 2014 s/p 1 stent, HLD is presenting to ED with chest pressure x 1 day. Pt endorses constant midsternal chest pressure, intermittently radiating to left arm that started last night while sitting. worse with exertion, relieved with rest. Associated with mild sob with exertion. Pt sees Dr. Caballero (cardiology). Pt states he still smokes. Pt denies f/c/n/v/d, diaphoresis, leg swelling, abd pain, cough, dizziness, lightheadedness.

## 2020-06-21 NOTE — ED PROVIDER NOTE - NS ED ROS FT
GEN: (-) fever, (-) Chills, (-) malaise  HEENT: (-) vision changes, (-) HA  CV: (+) chest pain, (-) palpitations, (-) edema  PULM: (-) cough, (-) wheezing, (+) sob  GI: (-) abdominal pain,(-) Nausea, (-) Vomiting, (-) Diarrhea  NEURO: (-) weakness, (-) paresthesias, (-) syncope, (-) dizziness, (-) lightheadedness  : (-) dysuria, (-) frequency, (-) urgency  MS: (-) back pain, (-) joint pain, (-)myalgias, (-) swelling  SKIN: (-) rashes, (-) new lesions  HEME: (-) bleeding, (-) ecchymosis

## 2020-06-21 NOTE — ED ADULT NURSE NOTE - NS ED PATIENT SAFETY CONCERN
Indication:  Right rib pain    Preoperative diagnosis: Intercostal nerualgia    Posteoperative diagnosis: Intercostal neuralgia    Procedure: Ultrasound guided right intercostal nerve blocks      After discussing the risks, benefits, and alternatives to the procedure, the patient expressed understanding and wished to proceed  The patient was brought to the procedure suite and placed in the prone position  A procedural pausewas conducted to verify: Correct patient identity, procedure to be performed and as applicable, correct side and site, correct patient position, and availability of implants, special equipment or special requirements  At approximately 2 cm lateral to the costovertebral junction, the ribs were identified and marked  A 12-4MHz linear ultrasound probe was used to identify the ribs and pleura  A 2 5 inch 25-gauge needle was advanced down to each riband then walked inferiorly  After negative aspiration, 20 mg of Depo-Medrol in 3 mL of 1% lidocaine was injected in the region of the intercostal nerves  The needle was then extracted  Postprocedure, the patient denied any abrupt worsening of any cough,pleuritic pain, or shortness of breath and respiratory status was unchanged pre-/post procedure  The patient tolerated the procedure well and there were no apparent complications  The patient was dismissed from the recovery area in stable condition  The patientwas advised not to travel in an airplane within the next 24 hours  COMMENTS: The patient received a total steroid dose of 20 mg Depo-Merol 
No

## 2020-06-22 LAB
A1C WITH ESTIMATED AVERAGE GLUCOSE RESULT: 6.6 % — HIGH (ref 4–5.6)
ALBUMIN SERPL ELPH-MCNC: 4.1 G/DL — SIGNIFICANT CHANGE UP (ref 3.5–5.2)
ALP SERPL-CCNC: 129 U/L — HIGH (ref 30–115)
ALT FLD-CCNC: 51 U/L — HIGH (ref 0–41)
ANION GAP SERPL CALC-SCNC: 12 MMOL/L — SIGNIFICANT CHANGE UP (ref 7–14)
APTT BLD: 41.6 SEC — HIGH (ref 27–39.2)
APTT BLD: 46.2 SEC — HIGH (ref 27–39.2)
APTT BLD: 63.6 SEC — HIGH (ref 27–39.2)
AST SERPL-CCNC: 40 U/L — SIGNIFICANT CHANGE UP (ref 0–41)
BASOPHILS # BLD AUTO: 0.06 K/UL — SIGNIFICANT CHANGE UP (ref 0–0.2)
BASOPHILS NFR BLD AUTO: 0.4 % — SIGNIFICANT CHANGE UP (ref 0–1)
BILIRUB SERPL-MCNC: 0.5 MG/DL — SIGNIFICANT CHANGE UP (ref 0.2–1.2)
BLD GP AB SCN SERPL QL: SIGNIFICANT CHANGE UP
BUN SERPL-MCNC: 13 MG/DL — SIGNIFICANT CHANGE UP (ref 10–20)
CALCIUM SERPL-MCNC: 9 MG/DL — SIGNIFICANT CHANGE UP (ref 8.5–10.1)
CHLORIDE SERPL-SCNC: 101 MMOL/L — SIGNIFICANT CHANGE UP (ref 98–110)
CHOLEST SERPL-MCNC: 131 MG/DL — SIGNIFICANT CHANGE UP (ref 100–200)
CO2 SERPL-SCNC: 26 MMOL/L — SIGNIFICANT CHANGE UP (ref 17–32)
CREAT SERPL-MCNC: 0.9 MG/DL — SIGNIFICANT CHANGE UP (ref 0.7–1.5)
EOSINOPHIL # BLD AUTO: 0.22 K/UL — SIGNIFICANT CHANGE UP (ref 0–0.7)
EOSINOPHIL NFR BLD AUTO: 1.4 % — SIGNIFICANT CHANGE UP (ref 0–8)
ESTIMATED AVERAGE GLUCOSE: 143 MG/DL — HIGH (ref 68–114)
GLUCOSE SERPL-MCNC: 135 MG/DL — HIGH (ref 70–99)
HCT VFR BLD CALC: 43 % — SIGNIFICANT CHANGE UP (ref 42–52)
HDLC SERPL-MCNC: 25 MG/DL — LOW
HGB BLD-MCNC: 14.7 G/DL — SIGNIFICANT CHANGE UP (ref 14–18)
IMM GRANULOCYTES NFR BLD AUTO: 0.3 % — SIGNIFICANT CHANGE UP (ref 0.1–0.3)
INR BLD: 1.08 RATIO — SIGNIFICANT CHANGE UP (ref 0.65–1.3)
LIPID PNL WITH DIRECT LDL SERPL: 71 MG/DL — SIGNIFICANT CHANGE UP (ref 4–129)
LYMPHOCYTES # BLD AUTO: 15 % — LOW (ref 20.5–51.1)
LYMPHOCYTES # BLD AUTO: 2.4 K/UL — SIGNIFICANT CHANGE UP (ref 1.2–3.4)
MAGNESIUM SERPL-MCNC: 1.8 MG/DL — SIGNIFICANT CHANGE UP (ref 1.8–2.4)
MCHC RBC-ENTMCNC: 30.4 PG — SIGNIFICANT CHANGE UP (ref 27–31)
MCHC RBC-ENTMCNC: 34.2 G/DL — SIGNIFICANT CHANGE UP (ref 32–37)
MCV RBC AUTO: 89 FL — SIGNIFICANT CHANGE UP (ref 80–94)
MONOCYTES # BLD AUTO: 0.84 K/UL — HIGH (ref 0.1–0.6)
MONOCYTES NFR BLD AUTO: 5.3 % — SIGNIFICANT CHANGE UP (ref 1.7–9.3)
NEUTROPHILS # BLD AUTO: 12.41 K/UL — HIGH (ref 1.4–6.5)
NEUTROPHILS NFR BLD AUTO: 77.6 % — HIGH (ref 42.2–75.2)
NRBC # BLD: 0 /100 WBCS — SIGNIFICANT CHANGE UP (ref 0–0)
PLATELET # BLD AUTO: 221 K/UL — SIGNIFICANT CHANGE UP (ref 130–400)
POTASSIUM SERPL-MCNC: 4.7 MMOL/L — SIGNIFICANT CHANGE UP (ref 3.5–5)
POTASSIUM SERPL-SCNC: 4.7 MMOL/L — SIGNIFICANT CHANGE UP (ref 3.5–5)
PROT SERPL-MCNC: 6.1 G/DL — SIGNIFICANT CHANGE UP (ref 6–8)
PROTHROM AB SERPL-ACNC: 12.4 SEC — SIGNIFICANT CHANGE UP (ref 9.95–12.87)
RBC # BLD: 4.83 M/UL — SIGNIFICANT CHANGE UP (ref 4.7–6.1)
RBC # FLD: 13.2 % — SIGNIFICANT CHANGE UP (ref 11.5–14.5)
SODIUM SERPL-SCNC: 139 MMOL/L — SIGNIFICANT CHANGE UP (ref 135–146)
TOTAL CHOLESTEROL/HDL RATIO MEASUREMENT: 5.2 RATIO — SIGNIFICANT CHANGE UP (ref 4–5.5)
TRIGL SERPL-MCNC: 257 MG/DL — HIGH (ref 10–149)
TROPONIN T SERPL-MCNC: 0.09 NG/ML — CRITICAL HIGH
TROPONIN T SERPL-MCNC: 0.1 NG/ML — CRITICAL HIGH
WBC # BLD: 15.98 K/UL — HIGH (ref 4.8–10.8)
WBC # FLD AUTO: 15.98 K/UL — HIGH (ref 4.8–10.8)

## 2020-06-22 PROCEDURE — 99406 BEHAV CHNG SMOKING 3-10 MIN: CPT

## 2020-06-22 PROCEDURE — 99233 SBSQ HOSP IP/OBS HIGH 50: CPT | Mod: 25

## 2020-06-22 PROCEDURE — 99222 1ST HOSP IP/OBS MODERATE 55: CPT | Mod: 57

## 2020-06-22 PROCEDURE — 93306 TTE W/DOPPLER COMPLETE: CPT | Mod: 26

## 2020-06-22 PROCEDURE — 93458 L HRT ARTERY/VENTRICLE ANGIO: CPT | Mod: 26,XU

## 2020-06-22 PROCEDURE — 93010 ELECTROCARDIOGRAM REPORT: CPT | Mod: 77

## 2020-06-22 PROCEDURE — 93010 ELECTROCARDIOGRAM REPORT: CPT

## 2020-06-22 PROCEDURE — 92928 PRQ TCAT PLMT NTRAC ST 1 LES: CPT | Mod: LC

## 2020-06-22 RX ORDER — SODIUM CHLORIDE 9 MG/ML
1000 INJECTION INTRAMUSCULAR; INTRAVENOUS; SUBCUTANEOUS
Refills: 0 | Status: DISCONTINUED | OUTPATIENT
Start: 2020-06-22 | End: 2020-06-23

## 2020-06-22 RX ORDER — HEPARIN SODIUM 5000 [USP'U]/ML
1400 INJECTION INTRAVENOUS; SUBCUTANEOUS
Qty: 25000 | Refills: 0 | Status: DISCONTINUED | OUTPATIENT
Start: 2020-06-22 | End: 2020-06-22

## 2020-06-22 RX ORDER — MORPHINE SULFATE 50 MG/1
4 CAPSULE, EXTENDED RELEASE ORAL ONCE
Refills: 0 | Status: DISCONTINUED | OUTPATIENT
Start: 2020-06-22 | End: 2020-06-22

## 2020-06-22 RX ORDER — ENOXAPARIN SODIUM 100 MG/ML
40 INJECTION SUBCUTANEOUS DAILY
Refills: 0 | Status: DISCONTINUED | OUTPATIENT
Start: 2020-06-23 | End: 2020-06-23

## 2020-06-22 RX ORDER — ONDANSETRON 8 MG/1
4 TABLET, FILM COATED ORAL ONCE
Refills: 0 | Status: COMPLETED | OUTPATIENT
Start: 2020-06-22 | End: 2020-06-22

## 2020-06-22 RX ORDER — PRASUGREL 5 MG/1
10 TABLET, FILM COATED ORAL DAILY
Refills: 0 | Status: DISCONTINUED | OUTPATIENT
Start: 2020-06-23 | End: 2020-06-23

## 2020-06-22 RX ORDER — PRASUGREL 5 MG/1
30 TABLET, FILM COATED ORAL ONCE
Refills: 0 | Status: COMPLETED | OUTPATIENT
Start: 2020-06-22 | End: 2020-06-22

## 2020-06-22 RX ORDER — HEPARIN SODIUM 5000 [USP'U]/ML
1200 INJECTION INTRAVENOUS; SUBCUTANEOUS
Qty: 25000 | Refills: 0 | Status: DISCONTINUED | OUTPATIENT
Start: 2020-06-22 | End: 2020-06-22

## 2020-06-22 RX ADMIN — Medication 1 TABLET(S): at 14:22

## 2020-06-22 RX ADMIN — Medication 50 MILLIGRAM(S): at 06:50

## 2020-06-22 RX ADMIN — PRASUGREL 30 MILLIGRAM(S): 5 TABLET, FILM COATED ORAL at 20:49

## 2020-06-22 RX ADMIN — Medication 2000 UNIT(S): at 14:22

## 2020-06-22 RX ADMIN — ONDANSETRON 4 MILLIGRAM(S): 8 TABLET, FILM COATED ORAL at 03:16

## 2020-06-22 RX ADMIN — ATORVASTATIN CALCIUM 40 MILLIGRAM(S): 80 TABLET, FILM COATED ORAL at 21:40

## 2020-06-22 RX ADMIN — HEPARIN SODIUM 1400 UNIT(S)/HR: 5000 INJECTION INTRAVENOUS; SUBCUTANEOUS at 14:23

## 2020-06-22 RX ADMIN — Medication 81 MILLIGRAM(S): at 14:22

## 2020-06-22 RX ADMIN — Medication 500 MILLIGRAM(S): at 14:22

## 2020-06-22 RX ADMIN — Medication 1.5 MICROGRAM(S)/MIN: at 14:23

## 2020-06-22 RX ADMIN — MORPHINE SULFATE 4 MILLIGRAM(S): 50 CAPSULE, EXTENDED RELEASE ORAL at 03:16

## 2020-06-22 RX ADMIN — CHLORHEXIDINE GLUCONATE 1 APPLICATION(S): 213 SOLUTION TOPICAL at 05:49

## 2020-06-22 RX ADMIN — Medication 400 INTERNATIONAL UNIT(S): at 14:23

## 2020-06-22 RX ADMIN — PANTOPRAZOLE SODIUM 40 MILLIGRAM(S): 20 TABLET, DELAYED RELEASE ORAL at 06:49

## 2020-06-22 RX ADMIN — CLOPIDOGREL BISULFATE 75 MILLIGRAM(S): 75 TABLET, FILM COATED ORAL at 14:22

## 2020-06-22 RX ADMIN — LORATADINE 10 MILLIGRAM(S): 10 TABLET ORAL at 14:22

## 2020-06-22 NOTE — PROGRESS NOTE ADULT - SUBJECTIVE AND OBJECTIVE BOX
SUBJECTIVE:    Patient is a 49y old Male who presents with a chief complaint of Chest pain (21 Jun 2020 18:08)    HPI:        PAST MEDICAL & SURGICAL HISTORY  Hypersensitivity pneumonitis  Obstructive sleep apnea  Hyperlipidemia  Coronary artery disease: s/p Stent in 2014  History of appendectomy  H/O heart artery stent    SOCIAL HISTORY:    ALLERGIES:  No Known Allergies    MEDICATIONS:  STANDING MEDICATIONS  ascorbic acid 500 milliGRAM(s) Oral daily  aspirin enteric coated 81 milliGRAM(s) Oral daily  atorvastatin 40 milliGRAM(s) Oral at bedtime  chlorhexidine 4% Liquid 1 Application(s) Topical every 24 hours  cholecalciferol 2000 Unit(s) Oral daily  clopidogrel Tablet 75 milliGRAM(s) Oral daily  heparin  Infusion. 1400 Unit(s)/Hr IV Continuous <Continuous>  loratadine 10 milliGRAM(s) Oral daily  metoprolol succinate ER 50 milliGRAM(s) Oral daily  multivitamin/minerals 1 Tablet(s) Oral daily  nitroglycerin  Infusion 5 MICROgram(s)/Min IV Continuous <Continuous>  pantoprazole    Tablet 40 milliGRAM(s) Oral before breakfast  vitamin E 400 International Unit(s) Oral daily    PRN MEDICATIONS    VITALS:   T(F): 97.6  HR: 60  BP: 126/61  RR: 16  SpO2: 95%    LABS:                        14.7   15.98 )-----------( 221      ( 22 Jun 2020 04:03 )             43.0     06-22    139  |  101  |  13  ----------------------------<  135<H>  4.7   |  26  |  0.9    Ca    9.0      22 Jun 2020 04:03  Mg     1.8     06-22    TPro  6.1  /  Alb  4.1  /  TBili  0.5  /  DBili  x   /  AST  40  /  ALT  51<H>  /  AlkPhos  129<H>  06-22    PT/INR - ( 22 Jun 2020 04:03 )   PT: 12.40 sec;   INR: 1.08 ratio         PTT - ( 22 Jun 2020 04:03 )  PTT:46.2 sec      Troponin T, Serum: 0.09 ng/mL <HH> (06-22-20 @ 04:03)  Creatine Kinase, Serum: 262 U/L <H> (06-21-20 @ 17:04)  Troponin T, Serum: 0.09 ng/mL <HH> (06-21-20 @ 17:04)  Troponin T, Serum: <0.01 ng/mL (06-21-20 @ 10:39)      CARDIAC MARKERS ( 22 Jun 2020 04:03 )  x     / 0.09 ng/mL / x     / x     / x      CARDIAC MARKERS ( 21 Jun 2020 17:04 )  x     / 0.09 ng/mL / 262 U/L / x     / 16.8 ng/mL  CARDIAC MARKERS ( 21 Jun 2020 10:39 )  x     / <0.01 ng/mL / x     / x     / x          RADIOLOGY:    PHYSICAL EXAM:  GEN: No acute distress  LUNGS: Clear to auscultation bilaterally   HEART: Regular  ABD: Soft, non-tender, non-distended.  EXT: NC/NC/NE/2+PP/VILLALBA/Skin Intact.   NEURO: AAOX3    Intravenous access:   NG tube:   Roldan Catheter: SUBJECTIVE:    Mr. Nfef is a 49 year old man with a history of CAD with stenting in 2014 presenting with chest pain overnight.     HPI: Mr. Neff is a 49 year old man with a history of CAD with stenting in 2014, hyperlipidemia, GEO on CPAP, and hypersensitivity pneumonitis related to plumbing work, presenting with pain that started the evening before he went to the ED. The pain was substernal, radiating to the arm and back, and constant. The following morning the pain returned 2 hours after waking, 7-8/10 in severity. The pain worsened with exertion, improved with rest, and was associated with dyspnea.     This morning he reports pain is improved with pain control. Pain did not respond to nitroglycerin infusion overnight and several doses of morphine IV were given with improved pain control. He also had nausea and was given ondansetron. This morning he reports headache, possibly side effect of nitrates. He does not have any other acute complaints.    PAST MEDICAL & SURGICAL HISTORY  Hypersensitivity pneumonitis  Obstructive sleep apnea  Hyperlipidemia  Coronary artery disease: s/p Stent in 2014  History of appendectomy    SOCIAL HISTORY:  Smokes 1 ppd for 30 years. Denies alcohol and recreational drugs.    ALLERGIES:  No Known Allergies    MEDICATIONS:  STANDING MEDICATIONS  ascorbic acid 500 milliGRAM(s) Oral daily  aspirin enteric coated 81 milliGRAM(s) Oral daily  atorvastatin 40 milliGRAM(s) Oral at bedtime  chlorhexidine 4% Liquid 1 Application(s) Topical every 24 hours  cholecalciferol 2000 Unit(s) Oral daily  clopidogrel Tablet 75 milliGRAM(s) Oral daily  heparin  Infusion. 1400 Unit(s)/Hr IV Continuous <Continuous>  loratadine 10 milliGRAM(s) Oral daily  metoprolol succinate ER 50 milliGRAM(s) Oral daily  multivitamin/minerals 1 Tablet(s) Oral daily  nitroglycerin  Infusion 5 MICROgram(s)/Min IV Continuous <Continuous>  pantoprazole    Tablet 40 milliGRAM(s) Oral before breakfast  vitamin E 400 International Unit(s) Oral daily    PRN MEDICATIONS    VITALS:   T(F): 97.6  HR: 60  BP: 126/61  RR: 16  SpO2: 95%    LABS:                        14.7   15.98 )-----------( 221      ( 22 Jun 2020 04:03 )             43.0     06-22    139  |  101  |  13  ----------------------------<  135<H>  4.7   |  26  |  0.9    Ca    9.0      22 Jun 2020 04:03  Mg     1.8     06-22    TPro  6.1  /  Alb  4.1  /  TBili  0.5  /  DBili  x   /  AST  40  /  ALT  51<H>  /  AlkPhos  129<H>  06-22    PT/INR - ( 22 Jun 2020 04:03 )   PT: 12.40 sec;   INR: 1.08 ratio      PTT - ( 22 Jun 2020 04:03 )  PTT:46.2 sec    Troponin T, Serum: 0.09 ng/mL <HH> (06-22-20 @ 04:03)  Creatine Kinase, Serum: 262 U/L <H> (06-21-20 @ 17:04)  Troponin T, Serum: 0.09 ng/mL <HH> (06-21-20 @ 17:04)  Troponin T, Serum: <0.01 ng/mL (06-21-20 @ 10:39)    CARDIAC MARKERS ( 22 Jun 2020 04:03 )  x     / 0.09 ng/mL / x     / x     / x      CARDIAC MARKERS ( 21 Jun 2020 17:04 )  x     / 0.09 ng/mL / 262 U/L / x     / 16.8 ng/mL  CARDIAC MARKERS ( 21 Jun 2020 10:39 )  x     / <0.01 ng/mL / x     / x     / x        RADIOLOGY:    X-ray chest 2 views PA/Lat 6/21/20:  No radiographic evidence of acute cardiopulmonary disease.    US Abdomen Limited 6/21/20:  Hepatic steatosis and hepatomegaly.  No gallstones are seen.  CBD top normal, 6.2 mm.    VA Duplex Lower Ext Vein Scan Bilat:  No evidence of deep venous thrombosis or superficial thrombophlebitis in the bilateral lower extremities.    TTE 6/22/20:  Summary:   1. Left ventricular ejection fraction, by visual estimation, is 60 to 65%.   2. Normal global left ventricular systolic function.    PHYSICAL EXAM:  GEN: In no acute distress. Speaking clearly and appropriately.  LUNGS: Clear to auscultation bilaterally. No crackles.   HEART: Regular rate and rhythm, normal S1 and S2.  ABD: Soft, non-tender, non-distended.  EXT: Mild lower extremity swelling bilaterally up to mid-shins.  NEURO: AAOX3. Moves all extremities independently. SUBJECTIVE:    Mr. Neff is a 49 year old man with a history of CAD with stenting in 2014 presenting with chest pain overnight.     HPI: Mr. Neff is a 49 year old man with a history of CAD with stenting in 2014, hyperlipidemia, GEO on CPAP, and hypersensitivity pneumonitis related to plumbing work, presenting with pain that started the evening before he went to the ED. The pain was substernal, radiating to the arm and back, and constant. The following morning the pain returned 2 hours after waking, 7-8/10 in severity. The pain worsened with exertion, improved with rest, and was associated with dyspnea.     This morning he reports pain is improved with pain control. Pain did not respond to nitroglycerin infusion overnight and several doses of morphine IV were given with improved pain control. He also had nausea and was given ondansetron. This morning he reports headache, possibly side effect of nitrates. He does not have any other acute complaints.    Attending Note: pt seen and examined at bedside. No CP currently or SOB. Current smoker. 1-1.5 pack per day.     PAST MEDICAL & SURGICAL HISTORY  Hypersensitivity pneumonitis  Obstructive sleep apnea  Hyperlipidemia  Coronary artery disease: s/p Stent in 2014  History of appendectomy    SOCIAL HISTORY:  Smokes 1 ppd for 30 years. Denies alcohol and recreational drugs.    ALLERGIES:  No Known Allergies    MEDICATIONS:  STANDING MEDICATIONS  ascorbic acid 500 milliGRAM(s) Oral daily  aspirin enteric coated 81 milliGRAM(s) Oral daily  atorvastatin 40 milliGRAM(s) Oral at bedtime  chlorhexidine 4% Liquid 1 Application(s) Topical every 24 hours  cholecalciferol 2000 Unit(s) Oral daily  clopidogrel Tablet 75 milliGRAM(s) Oral daily  heparin  Infusion. 1400 Unit(s)/Hr IV Continuous <Continuous>  loratadine 10 milliGRAM(s) Oral daily  metoprolol succinate ER 50 milliGRAM(s) Oral daily  multivitamin/minerals 1 Tablet(s) Oral daily  nitroglycerin  Infusion 5 MICROgram(s)/Min IV Continuous <Continuous>  pantoprazole    Tablet 40 milliGRAM(s) Oral before breakfast  vitamin E 400 International Unit(s) Oral daily    PRN MEDICATIONS    VITALS:   T(F): 97.6  HR: 60  BP: 126/61  RR: 16  SpO2: 95%    LABS:                        14.7   15.98 )-----------( 221      ( 22 Jun 2020 04:03 )             43.0     06-22    139  |  101  |  13  ----------------------------<  135<H>  4.7   |  26  |  0.9    Ca    9.0      22 Jun 2020 04:03  Mg     1.8     06-22    TPro  6.1  /  Alb  4.1  /  TBili  0.5  /  DBili  x   /  AST  40  /  ALT  51<H>  /  AlkPhos  129<H>  06-22    PT/INR - ( 22 Jun 2020 04:03 )   PT: 12.40 sec;   INR: 1.08 ratio      PTT - ( 22 Jun 2020 04:03 )  PTT:46.2 sec    Troponin T, Serum: 0.09 ng/mL <HH> (06-22-20 @ 04:03)  Creatine Kinase, Serum: 262 U/L <H> (06-21-20 @ 17:04)  Troponin T, Serum: 0.09 ng/mL <HH> (06-21-20 @ 17:04)  Troponin T, Serum: <0.01 ng/mL (06-21-20 @ 10:39)    CARDIAC MARKERS ( 22 Jun 2020 04:03 )  x     / 0.09 ng/mL / x     / x     / x      CARDIAC MARKERS ( 21 Jun 2020 17:04 )  x     / 0.09 ng/mL / 262 U/L / x     / 16.8 ng/mL  CARDIAC MARKERS ( 21 Jun 2020 10:39 )  x     / <0.01 ng/mL / x     / x     / x        RADIOLOGY:    X-ray chest 2 views PA/Lat 6/21/20:  No radiographic evidence of acute cardiopulmonary disease.    US Abdomen Limited 6/21/20:  Hepatic steatosis and hepatomegaly.  No gallstones are seen.  CBD top normal, 6.2 mm.    VA Duplex Lower Ext Vein Scan Bilat:  No evidence of deep venous thrombosis or superficial thrombophlebitis in the bilateral lower extremities.    TTE 6/22/20:  Summary:   1. Left ventricular ejection fraction, by visual estimation, is 60 to 65%.   2. Normal global left ventricular systolic function.    PHYSICAL EXAM:  GEN: In no acute distress. Speaking clearly and appropriately.  LUNGS: Clear to auscultation bilaterally. No crackles.   HEART: Regular rate and rhythm, normal S1 and S2.  ABD: Soft, non-tender, non-distended.  EXT: Mild lower extremity swelling bilaterally up to mid-shins.  NEURO: AAOX3. Moves all extremities independently.

## 2020-06-22 NOTE — PROGRESS NOTE ADULT - ASSESSMENT
Mr. Neff is a 49-year-old man with a history of CAD with stent in 2014, HLD, GEO, hypersensitivity pneumonitis, who presented with substernal chest pain radiating to the left arm and back overnight.     # Acute coronary syndrome: Patient was transferred from [facility] to [facility] for management of acute coronary syndrome.  - Medical management with aspirin, clopidogrel, atorvastatin, metoprolol succinate, and heparin infusion   - aPTT was subtherapeutic at 46 as of this morning; will monitor and dose heparin as indicated  - Nitroglycerin infusion  - EKG was normal on admission, will monitor on telemetry and repeat EKGs  - Cardiology plans to perform catheterization today; will follow  - Lower extremity b/l venous Doppler was negative for clots    # Obstructive sleep apnea:  - Continue CPAP at night; patient's family will bring home CPAP machine for comfort to help sleep  - Follow-up with primary care doctor    # Elevated liver function tests  - Believed to be BIRD vs statin induced  - Abdominal US showed hepatic steatosis; AST and ALT trending down  - Follow-up with primary care doctor    # Hypersensitivity pneumonitis  - Follow-up with primary care    # Hyperlipidemia  - Atorvastatin 40 mg oral at bedtime    # DVT prophylaxis: on heparin infusion for ACS  # GI prophylaxis: Pantoprazole 40 mg oral before breakfast Mr. Neff is a 49-year-old man with a history of CAD with stent in 2014, HLD, GEO, hypersensitivity pneumonitis, who presented with substernal chest pain radiating to the left arm and back overnight.     # Acute coronary syndrome 2/2 NSTEMI   Patient was transferred from [facility] to [facility] for management of acute coronary syndrome.  - Medical management with aspirin, clopidogrel, atorvastatin, metoprolol succinate, and heparin infusion   - aPTT was subtherapeutic at 46 as of this morning; will monitor and dose heparin as indicated  - Nitroglycerin infusion  - EKG was normal on admission, will monitor on telemetry and repeat EKGs  - Cardiology plans to perform catheterization today; will follow  - Lower extremity b/l venous Doppler was negative for clots    # Obstructive sleep apnea:  - Continue CPAP at night; patient's family will bring home CPAP machine for comfort to help sleep  - Follow-up with primary care doctor    # Elevated liver function tests  - Believed to be BIRD vs statin induced  - Abdominal US showed hepatic steatosis; AST and ALT trending down  - Follow-up with primary care doctor    #leukocytosis likely 2/2 stress- will follow- afebrile, no signs of infection     # Hypersensitivity pneumonitis  - Follow-up with primary care    # Hyperlipidemia  - Atorvastatin 40 mg oral at bedtime    # DVT prophylaxis: on heparin infusion for ACS  # GI prophylaxis: Pantoprazole 40 mg oral before breakfast Mr. Neff is a 49-year-old man with a history of CAD with stent in 2014, HLD, GEO, hypersensitivity pneumonitis, who presented with substernal chest pain radiating to the left arm and back overnight.     # Acute coronary syndrome 2/2 NSTEMI   Patient was transferred from [facility] to [place] [facility] for management of acute coronary syndrome.  - Medical management with aspirin, clopidogrel, atorvastatin, metoprolol succinate, and heparin infusion   - aPTT was subtherapeutic at 46 as of this morning; will monitor and dose heparin as indicated  - Nitroglycerin infusion  - EKG was normal on admission, will monitor on telemetry and repeat EKGs  - Cardiology plans to perform catheterization today; will follow  - Lower extremity b/l venous Doppler was negative for clots    # Obstructive sleep apnea:  - Continue CPAP at night; patient's family will bring home CPAP machine for comfort to help sleep  - Follow-up with primary care doctor    # Elevated liver function tests  - Believed to be BIRD vs statin induced  - Abdominal US showed hepatic steatosis; AST and ALT trending down  - Follow-up with primary care doctor    #leukocytosis likely 2/2 stress- will follow- afebrile, no signs of infection     # Hypersensitivity pneumonitis  - Follow-up with primary care    # Hyperlipidemia  - Atorvastatin 40 mg oral at bedtime    #current smoker-smoking cessation counseling done    # DVT prophylaxis: on heparin infusion for ACS  # GI prophylaxis: Pantoprazole 40 mg oral before breakfast

## 2020-06-22 NOTE — CHART NOTE - NSCHARTNOTEFT_GEN_A_CORE
Preliminary Cardiac Catheterization Post-Procedure Report:06-22-20 @ 20:19    Procedure Performed:  [x] Left Heart Catheterization  [ ] Right Heart Catheterization  [x] Percutaneous Coronary Intervention    Primary Physician: Dr. Keena MD  Assistant(s): Dr. Chirs MD and Dr. Clover MD    Preliminary Procedure Summary (Official full report to follow)    Pre-procedure diagnosis: NSTEMI  Post Procedure Diagnosis/Impression: 1 vessel disease (OM2)    Left Heart Catheterization:  approximate EF%: normal on 2d echo  [ ] Normal Coronary Arteries  [ ] Luminal Irregularities  [ ] non-obstructive CAD  [x] 1 vessel coronary artery disease (OM2)      Anesthesia Type  [x] conscious sedation  [x] local/regional anesthesia  [  ] general anesthesia    Estimated Blood Loss  [x ] less than 30 ml    Amount of Contrast used:  175 ml    Access  [ ] Rt. Femoral A  [ ] Rt. Femoral V  [x] Rt. Radial A (TR band)  [ ] Rt. Brachial V    Condition of patient after procedure  [x] stable  [  ] guarded  [  ] satisfactory     CATH SUMMARY/FINDINGS:    Dominance:   [ ] Right  [x] Left                  LM:     normal    LAD:                        prox LAD: patent prior stent  distal LAD: moderate disease    Diag:   D1: small, normal  D2: medium sized, mild disease    LCx: large sized, dominant, minor irregularities    OM1: minor irregularities  OM2: 95 % tubular stenosis, culprit for MI, s/p PCI    RCA: minor irregularities    LPDA: normal    Implants: Balloon angioplasty and DESIREE x 1 (2.5x26mm [place]) to OM2    Follow-up Care:  [x] Return to In-patient bed CCU  [x] ASA 81mg, Effient 10mg Q24, Lipitor 40mg QHS, Toprol 50mg XL  [x] intensive medical management  Monitor BUN/Cr  CMP/CBC @1130 pm  EKG in am  NS@100cc/hr x 10 hour  Smoking cessation

## 2020-06-23 ENCOUNTER — TRANSCRIPTION ENCOUNTER (OUTPATIENT)
Age: 50
End: 2020-06-23

## 2020-06-23 VITALS
DIASTOLIC BLOOD PRESSURE: 60 MMHG | SYSTOLIC BLOOD PRESSURE: 116 MMHG | HEART RATE: 74 BPM | OXYGEN SATURATION: 96 % | RESPIRATION RATE: 21 BRPM

## 2020-06-23 LAB
ALBUMIN SERPL ELPH-MCNC: 3.8 G/DL — SIGNIFICANT CHANGE UP (ref 3.5–5.2)
ALBUMIN SERPL ELPH-MCNC: 3.9 G/DL — SIGNIFICANT CHANGE UP (ref 3.5–5.2)
ALP SERPL-CCNC: 116 U/L — HIGH (ref 30–115)
ALP SERPL-CCNC: 119 U/L — HIGH (ref 30–115)
ALT FLD-CCNC: 37 U/L — SIGNIFICANT CHANGE UP (ref 0–41)
ALT FLD-CCNC: 39 U/L — SIGNIFICANT CHANGE UP (ref 0–41)
ANION GAP SERPL CALC-SCNC: 12 MMOL/L — SIGNIFICANT CHANGE UP (ref 7–14)
ANION GAP SERPL CALC-SCNC: 12 MMOL/L — SIGNIFICANT CHANGE UP (ref 7–14)
AST SERPL-CCNC: 24 U/L — SIGNIFICANT CHANGE UP (ref 0–41)
AST SERPL-CCNC: 24 U/L — SIGNIFICANT CHANGE UP (ref 0–41)
BILIRUB SERPL-MCNC: 0.4 MG/DL — SIGNIFICANT CHANGE UP (ref 0.2–1.2)
BILIRUB SERPL-MCNC: 0.4 MG/DL — SIGNIFICANT CHANGE UP (ref 0.2–1.2)
BUN SERPL-MCNC: 10 MG/DL — SIGNIFICANT CHANGE UP (ref 10–20)
BUN SERPL-MCNC: 10 MG/DL — SIGNIFICANT CHANGE UP (ref 10–20)
CALCIUM SERPL-MCNC: 8.7 MG/DL — SIGNIFICANT CHANGE UP (ref 8.5–10.1)
CALCIUM SERPL-MCNC: 8.8 MG/DL — SIGNIFICANT CHANGE UP (ref 8.5–10.1)
CHLORIDE SERPL-SCNC: 102 MMOL/L — SIGNIFICANT CHANGE UP (ref 98–110)
CHLORIDE SERPL-SCNC: 103 MMOL/L — SIGNIFICANT CHANGE UP (ref 98–110)
CO2 SERPL-SCNC: 26 MMOL/L — SIGNIFICANT CHANGE UP (ref 17–32)
CO2 SERPL-SCNC: 26 MMOL/L — SIGNIFICANT CHANGE UP (ref 17–32)
CREAT SERPL-MCNC: 0.9 MG/DL — SIGNIFICANT CHANGE UP (ref 0.7–1.5)
CREAT SERPL-MCNC: 1 MG/DL — SIGNIFICANT CHANGE UP (ref 0.7–1.5)
GLUCOSE SERPL-MCNC: 113 MG/DL — HIGH (ref 70–99)
GLUCOSE SERPL-MCNC: 117 MG/DL — HIGH (ref 70–99)
HCT VFR BLD CALC: 41.1 % — LOW (ref 42–52)
HCT VFR BLD CALC: 41.8 % — LOW (ref 42–52)
HGB BLD-MCNC: 13.7 G/DL — LOW (ref 14–18)
HGB BLD-MCNC: 13.8 G/DL — LOW (ref 14–18)
MAGNESIUM SERPL-MCNC: 2 MG/DL — SIGNIFICANT CHANGE UP (ref 1.8–2.4)
MCHC RBC-ENTMCNC: 29 PG — SIGNIFICANT CHANGE UP (ref 27–31)
MCHC RBC-ENTMCNC: 29.8 PG — SIGNIFICANT CHANGE UP (ref 27–31)
MCHC RBC-ENTMCNC: 32.8 G/DL — SIGNIFICANT CHANGE UP (ref 32–37)
MCHC RBC-ENTMCNC: 33.6 G/DL — SIGNIFICANT CHANGE UP (ref 32–37)
MCV RBC AUTO: 88.4 FL — SIGNIFICANT CHANGE UP (ref 80–94)
MCV RBC AUTO: 88.8 FL — SIGNIFICANT CHANGE UP (ref 80–94)
NRBC # BLD: 0 /100 WBCS — SIGNIFICANT CHANGE UP (ref 0–0)
NRBC # BLD: 0 /100 WBCS — SIGNIFICANT CHANGE UP (ref 0–0)
PLATELET # BLD AUTO: 197 K/UL — SIGNIFICANT CHANGE UP (ref 130–400)
PLATELET # BLD AUTO: 205 K/UL — SIGNIFICANT CHANGE UP (ref 130–400)
POTASSIUM SERPL-MCNC: 4.2 MMOL/L — SIGNIFICANT CHANGE UP (ref 3.5–5)
POTASSIUM SERPL-MCNC: 4.8 MMOL/L — SIGNIFICANT CHANGE UP (ref 3.5–5)
POTASSIUM SERPL-SCNC: 4.2 MMOL/L — SIGNIFICANT CHANGE UP (ref 3.5–5)
POTASSIUM SERPL-SCNC: 4.8 MMOL/L — SIGNIFICANT CHANGE UP (ref 3.5–5)
PROT SERPL-MCNC: 5.8 G/DL — LOW (ref 6–8)
PROT SERPL-MCNC: 5.8 G/DL — LOW (ref 6–8)
RBC # BLD: 4.63 M/UL — LOW (ref 4.7–6.1)
RBC # BLD: 4.73 M/UL — SIGNIFICANT CHANGE UP (ref 4.7–6.1)
RBC # FLD: 13.2 % — SIGNIFICANT CHANGE UP (ref 11.5–14.5)
RBC # FLD: 13.2 % — SIGNIFICANT CHANGE UP (ref 11.5–14.5)
SODIUM SERPL-SCNC: 140 MMOL/L — SIGNIFICANT CHANGE UP (ref 135–146)
SODIUM SERPL-SCNC: 141 MMOL/L — SIGNIFICANT CHANGE UP (ref 135–146)
WBC # BLD: 11.7 K/UL — HIGH (ref 4.8–10.8)
WBC # BLD: 11.99 K/UL — HIGH (ref 4.8–10.8)
WBC # FLD AUTO: 11.7 K/UL — HIGH (ref 4.8–10.8)
WBC # FLD AUTO: 11.99 K/UL — HIGH (ref 4.8–10.8)

## 2020-06-23 PROCEDURE — 99232 SBSQ HOSP IP/OBS MODERATE 35: CPT

## 2020-06-23 PROCEDURE — 99239 HOSP IP/OBS DSCHRG MGMT >30: CPT

## 2020-06-23 PROCEDURE — 93010 ELECTROCARDIOGRAM REPORT: CPT

## 2020-06-23 PROCEDURE — 93010 ELECTROCARDIOGRAM REPORT: CPT | Mod: 77

## 2020-06-23 RX ORDER — ASCORBIC ACID 60 MG
1 TABLET,CHEWABLE ORAL
Qty: 0 | Refills: 0 | DISCHARGE

## 2020-06-23 RX ORDER — CLOPIDOGREL BISULFATE 75 MG/1
1 TABLET, FILM COATED ORAL
Qty: 0 | Refills: 0 | DISCHARGE

## 2020-06-23 RX ORDER — VITAMIN E 100 UNIT
1 CAPSULE ORAL
Qty: 0 | Refills: 0 | DISCHARGE

## 2020-06-23 RX ORDER — CHOLECALCIFEROL (VITAMIN D3) 125 MCG
1 CAPSULE ORAL
Qty: 0 | Refills: 0 | DISCHARGE

## 2020-06-23 RX ORDER — OMEGA-3 ACID ETHYL ESTERS 1 G
1 CAPSULE ORAL
Qty: 0 | Refills: 0 | DISCHARGE

## 2020-06-23 RX ORDER — PRASUGREL 5 MG/1
1 TABLET, FILM COATED ORAL
Qty: 30 | Refills: 0
Start: 2020-06-23 | End: 2020-07-22

## 2020-06-23 RX ORDER — PANTOPRAZOLE SODIUM 20 MG/1
1 TABLET, DELAYED RELEASE ORAL
Qty: 0 | Refills: 0 | DISCHARGE
Start: 2020-06-23

## 2020-06-23 RX ADMIN — Medication 50 MILLIGRAM(S): at 05:16

## 2020-06-23 RX ADMIN — Medication 400 INTERNATIONAL UNIT(S): at 11:44

## 2020-06-23 RX ADMIN — ENOXAPARIN SODIUM 40 MILLIGRAM(S): 100 INJECTION SUBCUTANEOUS at 11:45

## 2020-06-23 RX ADMIN — Medication 500 MILLIGRAM(S): at 11:45

## 2020-06-23 RX ADMIN — CHLORHEXIDINE GLUCONATE 1 APPLICATION(S): 213 SOLUTION TOPICAL at 05:16

## 2020-06-23 RX ADMIN — LORATADINE 10 MILLIGRAM(S): 10 TABLET ORAL at 11:44

## 2020-06-23 RX ADMIN — Medication 81 MILLIGRAM(S): at 11:44

## 2020-06-23 RX ADMIN — PRASUGREL 10 MILLIGRAM(S): 5 TABLET, FILM COATED ORAL at 11:45

## 2020-06-23 RX ADMIN — Medication 1 TABLET(S): at 11:45

## 2020-06-23 RX ADMIN — Medication 2000 UNIT(S): at 11:44

## 2020-06-23 RX ADMIN — PANTOPRAZOLE SODIUM 40 MILLIGRAM(S): 20 TABLET, DELAYED RELEASE ORAL at 05:16

## 2020-06-23 NOTE — PROGRESS NOTE ADULT - ATTENDING COMMENTS
agree with above
#Progress Note Handoff  Pending (specify):  cath today, follow up report   Disposition: Home__x_/SNF___/Other___/Unknown at this time___  Pt seen during COVID 19 Pandemic.

## 2020-06-23 NOTE — DISCHARGE NOTE PROVIDER - NSDCCPCAREPLAN_GEN_ALL_CORE_FT
PRINCIPAL DISCHARGE DIAGNOSIS  Diagnosis: Chest pain  Assessment and Plan of Treatment: You had chest pain and you were found to have a non-ST elevation myocardial infarction. Continue taking the medications prescribed in the hospital including aspirin. We prescribed you a new medication, prasugrel (Effient), which helps prevent recurrences of myocardial infarction. Please follow up with your cardiologist, Dr. Caballero, in 1 week. Please follow up with your primary care doctor for other problems and health maintenance.      SECONDARY DISCHARGE DIAGNOSES  Diagnosis: Elevated LFTs  Assessment and Plan of Treatment: You were found to have elevated liver function tests. You had an abdominal ultrasound which showed an enlarged fatty liver. Please follow up with a gastroenterologist as we discussed. Please try to eat a healthy diet and exercise. As discussed, you did not have any deep vein thromboses in the legs based on a leg ultrasound. Please follow up with your primary care doctor.

## 2020-06-23 NOTE — DISCHARGE NOTE PROVIDER - CARE PROVIDER_API CALL
Melvin Caballero Y  CARDIOVASCULAR DISEASE  [address] [place] [id number]  Phone: [phone]  Fax: [phone]  Established Patient  Follow Up Time: 1 week    Danyel Vail  GASTROENTEROLOGY  [address] [place] [id number]  Phone: [phone]  Fax: [phone]  Follow Up Time: 2 weeks

## 2020-06-23 NOTE — DISCHARGE NOTE NURSING/CASE MANAGEMENT/SOCIAL WORK - PATIENT PORTAL LINK FT
You can access the FollowMyHealth Patient Portal offered by [facility] by registering at the following website: [email or web address]. By joining [facility]’s FollowMyHealth portal, you will also be able to view your health information using other applications (apps) compatible with our system.

## 2020-06-23 NOTE — PROGRESS NOTE ADULT - SUBJECTIVE AND OBJECTIVE BOX
Cardiology Follow up    JAMSHID PHIPPS   49y Male  PAST MEDICAL & SURGICAL HISTORY:  Hypersensitivity pneumonitis  Obstructive sleep apnea  Hyperlipidemia  Coronary artery disease: s/p Stent in 2014  History of appendectomy  H/O heart artery stent       HPI:  49 year old male patient with past medical history of coronary artery disease s/p stent in 2014, hyperlipidemia, obstructive sleep apnea on CPAP at home, hypersensitivity pneumonitis work-related (works as a ), presenting from for chest pain   The patient states that his pain started yesterday evening at home while he was watching the television, the pain is described as pressure sensation, radiates to the back and to the left upper extremity, and is constant. The patient states that he was able to sleep through the pain however 2 hours after he woke up this morning his pain worsened and was rated as 7 - 8 / 10 at the time. The pain is worse on exertion , associated with dyspnea and is improved at rest. He denies any associated fever /; chills, cough, sputum production, palpitations or dizziness. He states that for 2 weeks he noticed that his left lower extremity was more swollen than his right lower extremity but denies any erythema or pain at the site.   The patient had a stent placed in 2014 and had a treadmill stress test done last year in May, which was normal, and since then has not experienced any chest pain or angina.     In the ED : /79, HR 78, RR 20, Temp 98.8, SpO2 96 % on room air. Troponin T negative and ECG with no ischemic changes, the patient was transferred to the [facility] site given strong suspicion for acute coronary syndrome (21 Jun 2020 17:07)    Allergies    No Known Allergies    Intolerances    Patient without complaints. Pt ambulated without issues/symptoms  Denies CP, SOB, palpitations, or dizziness  No events on telemetry overnight    Vital Signs Last 24 Hrs  T(C): 36.7 (23 Jun 2020 08:00), Max: 36.7 (23 Jun 2020 08:00)  T(F): 98.1 (23 Jun 2020 08:00), Max: 98.1 (23 Jun 2020 08:00)  HR: 74 (23 Jun 2020 10:43) (64 - 84)  BP: 116/60 (23 Jun 2020 10:43) (100/59 - 140/77)  BP(mean): 84 (23 Jun 2020 08:00) (76 - 108)  RR: 21 (23 Jun 2020 10:43) (14 - 25)  SpO2: 96% (23 Jun 2020 10:43) (94% - 97%)    MEDICATIONS  (STANDING):  ascorbic acid 500 milliGRAM(s) Oral daily  aspirin enteric coated 81 milliGRAM(s) Oral daily  atorvastatin 40 milliGRAM(s) Oral at bedtime  chlorhexidine 4% Liquid 1 Application(s) Topical every 24 hours  cholecalciferol 2000 Unit(s) Oral daily  enoxaparin Injectable 40 milliGRAM(s) SubCutaneous daily  loratadine 10 milliGRAM(s) Oral daily  metoprolol succinate ER 50 milliGRAM(s) Oral daily  multivitamin/minerals 1 Tablet(s) Oral daily  pantoprazole    Tablet 40 milliGRAM(s) Oral before breakfast  prasugrel 10 milliGRAM(s) Oral daily  sodium chloride 0.9%. 1000 milliLiter(s) (100 mL/Hr) IV Continuous <Continuous>  vitamin E 400 International Unit(s) Oral daily    MEDICATIONS  (PRN):      REVIEW OF SYSTEMS:          CONSTITUTIONAL: No weakness, fevers or chills          EYES/ENT: No visual changes;  No vertigo or throat pain           NECK: No pain or stiffness          RESPIRATORY: No cough, wheezing, hemoptysis          CARDIOVASCULAR: no pain, no COSTA, no palpitations           GASTROINTESTINAL: No abdominal or epigastric pain. No nausea, vomiting, or hematemesis;           GENITOURINARY: No dysuria, frequency or hematuria          NEUROLOGICAL: No numbness or weakness          SKIN: No itching, rashes    PHYSICAL EXAM:           CONSTITUTIONAL: Well-developed; well-nourished; in no acute distress  	SKIN: warm, dry  	HEAD: Normocephalic; atraumatic  	EYES: PERRL.  	ENT: No nasal discharge, airway clear, mucous membranes moist  	NECK: Supple; non tender.  	CARD: +S1, +S2, no murmurs, gallops, or rubs. Regular rate and rhythm    	RESP: No wheezes, rales or rhonchi. CTA B/L  	ABD: soft ntnd, + BS x 4 quadrants  	EXT: moves all extremities,  no clubbing, cyanosis or edema  	NEURO: Alert and oriented x3, no focal deficits          PSYCH: Cooperative, appropriate          VASCULAR:  +2 Rad / +2 PTs / + 2 DPs          EXTREMITY:              Right Radial: dressing removed, access site soft, no hematoma, no pain, + pulses, no sign of infection, no numbness            ECG:   < from: 12 Lead ECG (06.23.20 @ 08:15) >  Ventricular Rate 72 BPM    Atrial Rate 72 BPM    P-R Interval 184 ms    QRS Duration 104 ms    Q-T Interval 376 ms    QTC Calculation(Bezet) 411 ms    P Axis 53 degrees    R Axis 52 degrees    T Axis 48 degrees    Diagnosis Line Normal sinus rhythm  Normal ECG    Confirmed by DUC SHARPE MD ([id number]) on 6/23/2020 10:08:31 AM                                                                                             2D ECHO:  < from: Transthoracic Echocardiogram (06.22.20 @ 08:19) >  Summary:   1. Left ventricular ejection fraction, by visual estimation, is 60 to 65%.   2. Normal global left ventricular systolic function.    PHYSICIAN INTERPRETATION:  Left Ventricle: The left ventricular internal cavity size is normal. Left ventricular wall thickness is normal. Global LV systolic function was normal. Left ventricular ejection fraction, by visual estimation, is 60 to 65%. Spectral Doppler shows normal pattern of LV diastolic filling.  Right Ventricle: Normal right ventricular size and function.  Left Atrium: Normal left atrial size.  Right Atrium: Normal right atrial size.  Pericardium: There is no evidence of pericardial effusion.  Mitral Valve: The mitral valve is normal in structure. No evidence of mitral stenosis. Trace mitral valve regurgitation is seen.  Tricuspid Valve: The tricuspid valve is grossly normal in structure. No tricuspid regurgitation is visualized.  Aortic Valve: The aortic valve was not well visualized. The aortic valve is trileaflet. No evidence of aortic stenosis. No aortic regurgitation.  Pulmonic Valve: The pulmonic valve was not well visualized. Trace pulmonic regurgitation.  Aorta: Aortic root measured at sinotubular junction is normal.  Pulmonary Artery: The pulmonary artery is not well seen.  Venous: The inferior vena cava was normal sized, with respiratory size variation greater than 50%.    LABS:                        13.7   11.70 )-----------( 205      ( 23 Jun 2020 04:46 )             41.8     06-23    140  |  102  |  10  ----------------------------<  113<H>  4.8   |  26  |  1.0    Ca    8.7      23 Jun 2020 04:46  Mg     2.0     06-23    A1C with Estimated Average Glucose (06.22.20 @ 04:03)    A1C with Estimated Average Glucose Result: 6.6: Method: Immunoassay    TPro  5.8<L>  /  Alb  3.9  /  TBili  0.4  /  DBili  x   /  AST  24  /  ALT  37  /  AlkPhos  119<H>  06-23    CARDIAC MARKERS ( 22 Jun 2020 11:31 )  x     / 0.10 ng/mL / x     / x     / x      CARDIAC MARKERS ( 22 Jun 2020 04:03 )  x     / 0.09 ng/mL / x     / x     / x      CARDIAC MARKERS ( 21 Jun 2020 17:04 )  x     / 0.09 ng/mL / 262 U/L / x     / 16.8 ng/mL    Magnesium, Serum: 2.0 mg/dL [1.8 - 2.4] (06-23-20 @ 04:46)  LIVER FUNCTIONS - ( 23 Jun 2020 04:46 )  Alb: 3.9 g/dL / Pro: 5.8 g/dL / ALK PHOS: 119 U/L / ALT: 37 U/L / AST: 24 U/L / GGT: x           Lipid Profile (06.22.20 @ 04:03)    Total Cholesterol/HDL Ratio Measurement: 5.2 Ratio    Cholesterol, Serum: 131 mg/dL    Triglycerides, Serum: 257 mg/dL    HDL Cholesterol, Serum: 25: HDL Levels >/= 60 mg/dL are considered beneficial and a "negative" risk  factor.  Effective 08/15/2018: New reference range and interpretive comment. mg/dL    Direct LDL: 71: LDL Cholesterol (mg/dL) --- Interpretive Comment (for adults 18 and over)    A/P:  I discussed the case with Cardiologist Dr. Brink and recommend the following:    S/P PCI:    Implants: Balloon angioplasty and DESIREE x 1 (2.5x26mm Spike) to OM2                     Ambulate patient around the unit                    will start ARB/ACEi as OP due to low B/P  	     Continue ASA 81mg, Effient 10mg Q24, Lipitor 40mg QHS, Toprol 50mg XL                   Patient pharmacy called in for Effient prescription and it is 3$ per month                   Patient agreeing to take DAPT for at least one year or as directed by cardiologist                    Pt given instructions on importance of taking antiplatelet medication or risk acute stent thrombosis/death                   Post cath instructions, access site care and activity restrictions reviewed with patient                     Discussed with patient to return to hospital if experience chest pain, shortness breath, dizziness and site bleeding                   Aggressive risk factor modification, diet counseling, smoking cessation discussed with patient                       Can discharge patient from cardiac standpoint                    Follow up with Cardiology Dr. Brink in one to two weeks.  Instructed to call and make an appointment

## 2020-06-23 NOTE — DISCHARGE NOTE PROVIDER - HOSPITAL COURSE
Mr. Neff presented to the [facility] ED with substernal chest pain radiating to the back and left arm. He had elevated troponin to 0.09 and EKG showing no ischemic changes. He also received an abdominal ultrasound to assess high LFTs, showing hepatomegaly and hepatic steatosis, and bilateral LE ultrasound, showing no thrombosis. He was transferred to the [facility] for management of acute coronary syndrome. During his hospitalization, Mr. Neff was treated for NSTEMI with aspirin, clopidogrel, metoprolol succinate, and heparin infusion. Symptom control was obtained with nitroglycerin infusion and several doses of morphine sulfate the night of admission to the CCU. He received cardiac catheterization on day 2 of CCU and chest pain subsequently improved. He was hemodynamically stable with improved symptoms and was ready for discharge with outpatient follow-up. Mr. Neff presented to the [facility] ED with substernal chest pain radiating to the back and left arm. He had elevated troponin to 0.09 and EKG showing no ischemic changes. He also received an abdominal ultrasound to assess high LFTs, showing hepatomegaly and hepatic steatosis, and bilateral LE ultrasound, showing no thrombosis. He was transferred to the [place] [facility] for management of acute coronary syndrome. During his hospitalization, Mr. Neff was treated for NSTEMI with aspirin, clopidogrel, metoprolol succinate, and heparin infusion. Symptom control was obtained with nitroglycerin infusion and several doses of morphine sulfate the night of admission to the CCU. He received cardiac catheterization on day 2 of CCU and stent was placed as he was found to have 1VD. chest pain subsequently improved. He was hemodynamically stable with improved symptoms and was ready for discharge with outpatient follow-up.

## 2020-06-23 NOTE — DISCHARGE NOTE PROVIDER - CARE PROVIDERS DIRECT ADDRESSES
[email or web address]

## 2020-06-23 NOTE — DISCHARGE NOTE PROVIDER - NSDCMRMEDTOKEN_GEN_ALL_CORE_FT
Aspirin Enteric Coated 81 mg oral delayed release tablet: 1 tab(s) orally once a day  atorvastatin 40 mg oral tablet: 1 tab(s) orally once a day  CoQ10 300 mg oral capsule: 1 cap(s) orally once a day  Daily Multiple for Men 50+ oral tablet: 1 tab(s) orally once a day  fluticasone 50 mcg/inh inhalation powder: 1 puff(s) inhaled 2 times a day  glucosamine hydrochloride 1500 mg oral tablet: 1 tab(s) orally 2 times a day  loratadine 10 mg oral tablet: 1 tab(s) orally once a day  Metoprolol Succinate ER 50 mg oral tablet, extended release: 1 tab(s) orally once a day  prasugrel 10 mg oral tablet: 1 tab(s) orally once a day MDD:1

## 2020-06-23 NOTE — DISCHARGE NOTE PROVIDER - PROVIDER TOKENS
PROVIDER:[TOKEN:[[id number]],FOLLOWUP:[1 week],ESTABLISHEDPATIENT:[T]],PROVIDER:[TOKEN:[[id number]],FOLLOWUP:[2 weeks]]

## 2020-06-25 ENCOUNTER — TRANSCRIPTION ENCOUNTER (OUTPATIENT)
Age: 50
End: 2020-06-25

## 2020-06-25 DIAGNOSIS — Z79.02 LONG TERM (CURRENT) USE OF ANTITHROMBOTICS/ANTIPLATELETS: ICD-10-CM

## 2020-06-25 DIAGNOSIS — E78.5 HYPERLIPIDEMIA, UNSPECIFIED: ICD-10-CM

## 2020-06-25 DIAGNOSIS — I21.4 NON-ST ELEVATION (NSTEMI) MYOCARDIAL INFARCTION: ICD-10-CM

## 2020-06-25 DIAGNOSIS — K76.0 FATTY (CHANGE OF) LIVER, NOT ELSEWHERE CLASSIFIED: ICD-10-CM

## 2020-06-25 DIAGNOSIS — I25.10 ATHEROSCLEROTIC HEART DISEASE OF NATIVE CORONARY ARTERY WITHOUT ANGINA PECTORIS: ICD-10-CM

## 2020-06-25 DIAGNOSIS — F17.210 NICOTINE DEPENDENCE, CIGARETTES, UNCOMPLICATED: ICD-10-CM

## 2020-06-25 DIAGNOSIS — Z95.5 PRESENCE OF CORONARY ANGIOPLASTY IMPLANT AND GRAFT: ICD-10-CM

## 2020-06-25 DIAGNOSIS — Z57.8 OCCUPATIONAL EXPOSURE TO OTHER RISK FACTORS: ICD-10-CM

## 2020-06-25 DIAGNOSIS — Z79.82 LONG TERM (CURRENT) USE OF ASPIRIN: ICD-10-CM

## 2020-06-25 DIAGNOSIS — D72.829 ELEVATED WHITE BLOOD CELL COUNT, UNSPECIFIED: ICD-10-CM

## 2020-06-25 DIAGNOSIS — Z99.89 DEPENDENCE ON OTHER ENABLING MACHINES AND DEVICES: ICD-10-CM

## 2020-06-25 DIAGNOSIS — G47.33 OBSTRUCTIVE SLEEP APNEA (ADULT) (PEDIATRIC): ICD-10-CM

## 2020-06-25 DIAGNOSIS — R74.0 NONSPECIFIC ELEVATION OF LEVELS OF TRANSAMINASE AND LACTIC ACID DEHYDROGENASE [LDH]: ICD-10-CM

## 2020-06-25 DIAGNOSIS — J67.9 HYPERSENSITIVITY PNEUMONITIS DUE TO UNSPECIFIED ORGANIC DUST: ICD-10-CM

## 2020-06-25 SDOH — HEALTH STABILITY - PHYSICAL HEALTH: OCCUPATIONAL EXPOSURE TO OTHER RISK FACTORS: Z57.8

## 2020-07-01 ENCOUNTER — APPOINTMENT (OUTPATIENT)
Dept: CARDIOLOGY | Facility: CLINIC | Age: 50
End: 2020-07-01
Payer: MEDICAID

## 2020-07-01 VITALS
DIASTOLIC BLOOD PRESSURE: 78 MMHG | BODY MASS INDEX: 40.29 KG/M2 | HEIGHT: 69 IN | WEIGHT: 272 LBS | SYSTOLIC BLOOD PRESSURE: 124 MMHG | TEMPERATURE: 98.1 F | HEART RATE: 57 BPM

## 2020-07-01 PROCEDURE — 93000 ELECTROCARDIOGRAM COMPLETE: CPT

## 2020-07-01 PROCEDURE — 99214 OFFICE O/P EST MOD 30 MIN: CPT

## 2020-07-01 RX ORDER — EMPAGLIFLOZIN 10 MG/1
10 TABLET, FILM COATED ORAL
Qty: 30 | Refills: 0 | Status: DISCONTINUED | COMMUNITY
Start: 2019-03-25 | End: 2020-07-01

## 2020-07-01 NOTE — REASON FOR VISIT
[Follow-Up - From Hospitalization] : follow-up of a recent hospitalization for [Coronary Artery Disease] : coronary artery disease [FreeTextEntry2] : cad [FreeTextEntry1] : 48 y/o male with history of HTN, DL, CAD, s/p PCI of LAD in February of 2014,\par 6/20 admitted with ami, s/p pci of om with jimmy\par presenting for follow-up after hx \par The patient reports no chest pain, does have some dyspnea, no orthopnea or PND. \par stopped smoking, lost 10 lbs\par Dx of GEO, started on CPAP. Also has interstitial lung disease on CT chest, being w/u for interstitial pneumonitis. Following with pulmonary.

## 2020-07-01 NOTE — PHYSICAL EXAM
[General Appearance - Well Nourished] : well nourished [General Appearance - Well Developed] : well developed [Normal Conjunctiva] : the conjunctiva exhibited no abnormalities [General Appearance - In No Acute Distress] : no acute distress [Normal Oral Mucosa] : normal oral mucosa [Normal Oropharynx] : normal oropharynx [Normal Jugular Venous V Waves Present] : normal jugular venous V waves present [] : no respiratory distress [Respiration, Rhythm And Depth] : normal respiratory rhythm and effort [Auscultation Breath Sounds / Voice Sounds] : lungs were clear to auscultation bilaterally [Heart Sounds] : normal S1 and S2 [Murmurs] : no murmurs present [Heart Rate And Rhythm] : heart rate and rhythm were normal [Edema] : no peripheral edema present [Bowel Sounds] : normal bowel sounds [Arterial Pulses Normal] : the arterial pulses were normal [Abnormal Walk] : normal gait [Abdomen Tenderness] : non-tender [Abdomen Soft] : soft [Oriented To Time, Place, And Person] : oriented to person, place, and time [Skin Color & Pigmentation] : normal skin color and pigmentation [Nail Clubbing] : no clubbing of the fingernails [Cyanosis, Localized] : no localized cyanosis [Affect] : the affect was normal [FreeTextEntry1] : overweight

## 2020-09-14 ENCOUNTER — APPOINTMENT (OUTPATIENT)
Dept: CARDIOLOGY | Facility: CLINIC | Age: 50
End: 2020-09-14
Payer: COMMERCIAL

## 2020-09-14 VITALS
BODY MASS INDEX: 41.47 KG/M2 | HEIGHT: 69 IN | SYSTOLIC BLOOD PRESSURE: 130 MMHG | DIASTOLIC BLOOD PRESSURE: 76 MMHG | WEIGHT: 280 LBS | TEMPERATURE: 97.6 F

## 2020-09-14 PROCEDURE — 99214 OFFICE O/P EST MOD 30 MIN: CPT

## 2020-09-14 PROCEDURE — 93000 ELECTROCARDIOGRAM COMPLETE: CPT

## 2020-09-14 NOTE — PHYSICAL EXAM
[General Appearance - Well Developed] : well developed [General Appearance - Well Nourished] : well nourished [General Appearance - In No Acute Distress] : no acute distress [Normal Conjunctiva] : the conjunctiva exhibited no abnormalities [FreeTextEntry1] : NC/AT, PERRL [Normal Jugular Venous V Waves Present] : normal jugular venous V waves present [Respiration, Rhythm And Depth] : normal respiratory rhythm and effort [] : no respiratory distress [Auscultation Breath Sounds / Voice Sounds] : lungs were clear to auscultation bilaterally [Exaggerated Use Of Accessory Muscles For Inspiration] : no accessory muscle use [Murmurs] : no murmurs present [Heart Sounds] : normal S1 and S2 [Heart Rate And Rhythm] : heart rate and rhythm were normal [Arterial Pulses Normal] : the arterial pulses were normal [Bowel Sounds] : normal bowel sounds [Edema] : no peripheral edema present [Abdomen Soft] : soft [Abdomen Tenderness] : non-tender [Nail Clubbing] : no clubbing of the fingernails [Abnormal Walk] : normal gait [Cyanosis, Localized] : no localized cyanosis [Skin Color & Pigmentation] : normal skin color and pigmentation [Affect] : the affect was normal [Oriented To Time, Place, And Person] : oriented to person, place, and time

## 2020-09-14 NOTE — ASSESSMENT
[FreeTextEntry1] : CAD, s/p PCI of LAD with DESIREE. Now s/p PCI of OM\par Had a sleep test - diagnosed with GEO. Using CPAP.\par No angina.\par CT chest noted. Follow-up with pulmonary, ENT.\par Patient was advised about healthy lifestyle changes, including diet and exercise. Importance of sustained long-term weight loss was discussed, questions answered.\par Quit smoking x 90 days now - encouraged to stay off tobacco products.\par Secondary prevention discussed.\par Labs reviewed.\par No changes in medical therapy at this time.\par Will obtain a nuclear stress test in 6 months (January) to assess for ischemia, evaluate significance of distal LAD.\par F/u in 3 months, or if any symptoms.

## 2020-09-14 NOTE — REASON FOR VISIT
[Follow-Up - Clinic] : a clinic follow-up of [Coronary Artery Disease] : coronary artery disease [FreeTextEntry1] : 51 y/o male with history of HTN, DL, CAD, s/p PCI of LAD in February of 2014, presenting for follow-up. Since the last visit, he unfortunately suffered an NSTEMI, and is s/p PCI of OM with DESIREE by Dr. Brink. Since the discharge, the patient reports no chest pain, dyspnea, orthopnea or PND. Unable to loose weight, actually gained.  He quit smoking after the MI and has been tobacco free for 90 days.   Was seen by pulmonary, was diagnosed with GEO, started on CPAP. Also has interstitial lung disease on CT chest, being w/u for interstitial pneumonitis. Following with pulmonary. He had labs done at Dr. Roth's office, LDL and HgA1c were at goal.  Cath report reviewed. Patent stent in LAD. Distal LAD moderate lesion. OM - severe lesion, successfully stented with DESIREE.

## 2020-09-25 ENCOUNTER — APPOINTMENT (OUTPATIENT)
Dept: CARDIOLOGY | Facility: CLINIC | Age: 50
End: 2020-09-25

## 2020-09-29 ENCOUNTER — OUTPATIENT (OUTPATIENT)
Dept: OUTPATIENT SERVICES | Facility: HOSPITAL | Age: 50
LOS: 1 days | Discharge: HOME | End: 2020-09-29

## 2020-09-29 ENCOUNTER — APPOINTMENT (OUTPATIENT)
Dept: PODIATRY | Facility: CLINIC | Age: 50
End: 2020-09-29
Payer: COMMERCIAL

## 2020-09-29 DIAGNOSIS — B35.1 TINEA UNGUIUM: ICD-10-CM

## 2020-09-29 DIAGNOSIS — Z90.49 ACQUIRED ABSENCE OF OTHER SPECIFIED PARTS OF DIGESTIVE TRACT: Chronic | ICD-10-CM

## 2020-09-29 DIAGNOSIS — Z95.5 PRESENCE OF CORONARY ANGIOPLASTY IMPLANT AND GRAFT: Chronic | ICD-10-CM

## 2020-09-29 PROCEDURE — 99203 OFFICE O/P NEW LOW 30 MIN: CPT

## 2020-09-29 NOTE — HISTORY OF PRESENT ILLNESS
[FreeTextEntry1] : 50 year old Non-Diabetic pt presents with chief complaint of nail fungus. \par Pt has tried OTC topical nail treatments without improvement. \par Denies any other pedal complaints \par Denies N/V/F/C/shortness of breath

## 2020-09-29 NOTE — END OF VISIT
[] : Resident [Resident] : Resident [FreeTextEntry3] : agree with above note.  \par Was present and instructing resident during visit. \par All Procedure performed under direct supervision.  \par DANTE Garcia DPM\par  [FreeTextEntry2] : agree with above note.  \par Was present and instructing resident during visit. \par All Procedure performed under direct supervision.  \par DANTE Garcia DPM\par

## 2020-09-29 NOTE — PHYSICAL EXAM
[Ankle Swelling (On Exam)] : present [Ankle Swelling Bilaterally] : bilaterally  [2+] : left foot dorsalis pedis 2+ [] : normal strength/tone [Skin Lesions] : no skin lesions [Affect] : the affect was normal [Mood] : the mood was normal [General Appearance - Alert] : alert [General Appearance - In No Acute Distress] : in no acute distress [FreeTextEntry1] : 1st and 5th digit nail, B/L appear dystrophic, thickened, and discolored\par 4th interspace Left foot maceration  [Vibration Dec.] : normal vibratory sensation at the level of the toes [Diminished Throughout Right Foot] : normal sensation with monofilament testing throughout right foot [Diminished Throughout Left Foot] : normal sensation with monofilament testing throughout left foot

## 2020-12-14 ENCOUNTER — APPOINTMENT (OUTPATIENT)
Dept: CARDIOLOGY | Facility: CLINIC | Age: 50
End: 2020-12-14
Payer: COMMERCIAL

## 2020-12-14 ENCOUNTER — APPOINTMENT (OUTPATIENT)
Dept: CARDIOLOGY | Facility: CLINIC | Age: 50
End: 2020-12-14

## 2020-12-14 VITALS
HEIGHT: 69 IN | TEMPERATURE: 97.7 F | DIASTOLIC BLOOD PRESSURE: 82 MMHG | BODY MASS INDEX: 43.25 KG/M2 | HEART RATE: 78 BPM | SYSTOLIC BLOOD PRESSURE: 138 MMHG | WEIGHT: 292 LBS

## 2020-12-14 PROCEDURE — 99214 OFFICE O/P EST MOD 30 MIN: CPT

## 2020-12-14 PROCEDURE — 99072 ADDL SUPL MATRL&STAF TM PHE: CPT

## 2020-12-14 PROCEDURE — 93000 ELECTROCARDIOGRAM COMPLETE: CPT

## 2020-12-14 NOTE — ASSESSMENT
[FreeTextEntry1] : CAD, s/p PCI of LAD with DESIREE. Now s/p PCI of OM\par Had a sleep test - diagnosed with GEO. Using CPAP.\par No angina.\par CT chest noted. Follow-up with pulmonary, ENT.\par Patient was advised about healthy lifestyle changes, including diet and exercise. Importance of sustained long-term weight loss was discussed, questions answered.\par Quit smoking x 180 days now - encouraged to stay off tobacco products.\par Secondary prevention discussed.\par Labs from August reviewed. \par No changes in medical therapy at this time.\par Will obtain a nuclear stress test to assess for ischemia, evaluate significance of distal LAD.\par F/u in 3 months, or if any symptoms.

## 2020-12-14 NOTE — REASON FOR VISIT
[Follow-Up - Clinic] : a clinic follow-up of [Coronary Artery Disease] : coronary artery disease [FreeTextEntry1] : 49 y/o male with history of HTN, DL, CAD, s/p PCI of LAD in February of 2014, presenting for follow-up. Since the last visit, he unfortunately suffered an NSTEMI, and is s/p PCI of OM with DESIREE by Dr. Brink. Since the last visit, the patient reports no chest pain, dyspnea, orthopnea or PND. Unable to loose weight, actually gained.  He quit smoking after the MI and has been tobacco free for 90 days.   Was seen by pulmonary, was diagnosed with GEO, started on CPAP. Also has interstitial lung disease on CT chest, being w/u for interstitial pneumonitis. Following with pulmonary. He had labs done at Dr. Roth's office, LDL and HgA1c were at goal.  Cath report reviewed. Patent stent in LAD. Distal LAD moderate lesion. OM - severe lesion, successfully stented with DESIREE.

## 2020-12-14 NOTE — PHYSICAL EXAM
[General Appearance - Well Developed] : well developed [General Appearance - Well Nourished] : well nourished [General Appearance - In No Acute Distress] : no acute distress [Normal Conjunctiva] : the conjunctiva exhibited no abnormalities [FreeTextEntry1] : NC/AT, PERRL [Normal Jugular Venous V Waves Present] : normal jugular venous V waves present [] : no respiratory distress [Respiration, Rhythm And Depth] : normal respiratory rhythm and effort [Exaggerated Use Of Accessory Muscles For Inspiration] : no accessory muscle use [Auscultation Breath Sounds / Voice Sounds] : lungs were clear to auscultation bilaterally [Heart Rate And Rhythm] : heart rate and rhythm were normal [Heart Sounds] : normal S1 and S2 [Murmurs] : no murmurs present [Arterial Pulses Normal] : the arterial pulses were normal [Edema] : no peripheral edema present [Bowel Sounds] : normal bowel sounds [Abdomen Soft] : soft [Abdomen Tenderness] : non-tender [Abnormal Walk] : normal gait [Nail Clubbing] : no clubbing of the fingernails [Cyanosis, Localized] : no localized cyanosis [Skin Color & Pigmentation] : normal skin color and pigmentation [Oriented To Time, Place, And Person] : oriented to person, place, and time [Affect] : the affect was normal

## 2020-12-22 ENCOUNTER — APPOINTMENT (OUTPATIENT)
Dept: PODIATRY | Facility: CLINIC | Age: 50
End: 2020-12-22

## 2021-01-05 ENCOUNTER — RESULT REVIEW (OUTPATIENT)
Age: 51
End: 2021-01-05

## 2021-01-05 ENCOUNTER — OUTPATIENT (OUTPATIENT)
Dept: OUTPATIENT SERVICES | Facility: HOSPITAL | Age: 51
LOS: 1 days | Discharge: HOME | End: 2021-01-05
Payer: COMMERCIAL

## 2021-01-05 DIAGNOSIS — Z95.5 PRESENCE OF CORONARY ANGIOPLASTY IMPLANT AND GRAFT: Chronic | ICD-10-CM

## 2021-01-05 DIAGNOSIS — I25.10 ATHEROSCLEROTIC HEART DISEASE OF NATIVE CORONARY ARTERY WITHOUT ANGINA PECTORIS: ICD-10-CM

## 2021-01-05 DIAGNOSIS — Z90.49 ACQUIRED ABSENCE OF OTHER SPECIFIED PARTS OF DIGESTIVE TRACT: Chronic | ICD-10-CM

## 2021-01-05 PROCEDURE — 78452 HT MUSCLE IMAGE SPECT MULT: CPT | Mod: 26

## 2021-01-05 PROCEDURE — 93018 CV STRESS TEST I&R ONLY: CPT

## 2021-02-23 ENCOUNTER — APPOINTMENT (OUTPATIENT)
Dept: PODIATRY | Facility: CLINIC | Age: 51
End: 2021-02-23

## 2021-04-27 ENCOUNTER — APPOINTMENT (OUTPATIENT)
Dept: PODIATRY | Facility: CLINIC | Age: 51
End: 2021-04-27

## 2021-06-14 ENCOUNTER — APPOINTMENT (OUTPATIENT)
Dept: CARDIOLOGY | Facility: CLINIC | Age: 51
End: 2021-06-14
Payer: COMMERCIAL

## 2021-06-14 VITALS
TEMPERATURE: 97.3 F | SYSTOLIC BLOOD PRESSURE: 146 MMHG | DIASTOLIC BLOOD PRESSURE: 82 MMHG | HEART RATE: 65 BPM | HEIGHT: 69 IN | WEIGHT: 283 LBS | BODY MASS INDEX: 41.92 KG/M2

## 2021-06-14 PROCEDURE — 99214 OFFICE O/P EST MOD 30 MIN: CPT

## 2021-06-14 PROCEDURE — 99072 ADDL SUPL MATRL&STAF TM PHE: CPT

## 2021-06-14 PROCEDURE — 93000 ELECTROCARDIOGRAM COMPLETE: CPT

## 2021-06-14 NOTE — PHYSICAL EXAM
[General Appearance - Well Developed] : well developed [General Appearance - Well Nourished] : well nourished [General Appearance - In No Acute Distress] : no acute distress [Normal Conjunctiva] : the conjunctiva exhibited no abnormalities [FreeTextEntry1] : NC/AT, PERRL [Normal Jugular Venous V Waves Present] : normal jugular venous V waves present [] : no respiratory distress [Respiration, Rhythm And Depth] : normal respiratory rhythm and effort [Exaggerated Use Of Accessory Muscles For Inspiration] : no accessory muscle use [Auscultation Breath Sounds / Voice Sounds] : lungs were clear to auscultation bilaterally [Heart Rate And Rhythm] : heart rate and rhythm were normal [Heart Sounds] : normal S1 and S2 [Murmurs] : no murmurs present [Edema] : no peripheral edema present [Arterial Pulses Normal] : the arterial pulses were normal [Bowel Sounds] : normal bowel sounds [Abdomen Soft] : soft [Abdomen Tenderness] : non-tender [Abnormal Walk] : normal gait [Nail Clubbing] : no clubbing of the fingernails [Cyanosis, Localized] : no localized cyanosis [Skin Color & Pigmentation] : normal skin color and pigmentation [Oriented To Time, Place, And Person] : oriented to person, place, and time [Affect] : the affect was normal

## 2021-06-14 NOTE — ASSESSMENT
[FreeTextEntry1] : CAD, s/p PCI of LAD with DESIREE. Now s/p PCI of OM\par Had a sleep test - diagnosed with GEO. Using CPAP.\par No angina.\par CT chest noted. Follow-up with pulmonary, ENT.\par Patient was advised about healthy lifestyle changes, including diet and exercise. Importance of sustained long-term weight loss was discussed, questions answered.\par Quit smoking x a year now - encouraged to stay off tobacco products.\par Secondary prevention discussed.\par Labs from August reviewed. \par Add losartan. No changes in medical therapy at this time otherwise.\par \par F/u in 3 months, or if any symptoms.

## 2021-06-14 NOTE — REASON FOR VISIT
[Symptom and Test Evaluation] : symptom and test evaluation [FreeTextEntry1] : 49 y/o male with history of HTN, DL, CAD, s/p PCI of LAD in February of 2014, presenting for follow-up. Since the last visit, he unfortunately suffered an NSTEMI, and is s/p PCI of OM with DESIREE by Dr. Brink. Since the last visit, the patient reports no chest pain, dyspnea, orthopnea or PND. Unable to loose weight, actually gained.  He quit smoking after the MI and has been tobacco free for 90 days.   Was seen by pulmonary, was diagnosed with GEO, started on CPAP. Also has interstitial lung disease on CT chest, being w/u for interstitial pneumonitis. Following with pulmonary. He had labs done at Dr. Roth's office, LDL and HgA1c were at goal.  Cath report reviewed. Patent stent in LAD. Distal LAD moderate lesion. OM - severe lesion, successfully stented with DESIREE.  [Follow-Up - Clinic] : a clinic follow-up of [Coronary Artery Disease] : coronary artery disease

## 2021-07-06 ENCOUNTER — APPOINTMENT (OUTPATIENT)
Dept: PODIATRY | Facility: CLINIC | Age: 51
End: 2021-07-06

## 2021-09-14 ENCOUNTER — APPOINTMENT (OUTPATIENT)
Dept: PODIATRY | Facility: CLINIC | Age: 51
End: 2021-09-14

## 2021-11-22 ENCOUNTER — RESULT CHARGE (OUTPATIENT)
Age: 51
End: 2021-11-22

## 2021-11-22 ENCOUNTER — APPOINTMENT (OUTPATIENT)
Dept: CARDIOLOGY | Facility: CLINIC | Age: 51
End: 2021-11-22
Payer: COMMERCIAL

## 2021-11-22 VITALS
WEIGHT: 282 LBS | HEIGHT: 69 IN | DIASTOLIC BLOOD PRESSURE: 78 MMHG | HEART RATE: 69 BPM | TEMPERATURE: 97.1 F | BODY MASS INDEX: 41.77 KG/M2 | SYSTOLIC BLOOD PRESSURE: 120 MMHG

## 2021-11-22 PROCEDURE — 93000 ELECTROCARDIOGRAM COMPLETE: CPT

## 2021-11-22 PROCEDURE — 99213 OFFICE O/P EST LOW 20 MIN: CPT

## 2021-11-22 NOTE — REASON FOR VISIT
[Symptom and Test Evaluation] : symptom and test evaluation [FreeTextEntry1] : 52 y/o male with history of HTN, DL, CAD, s/p PCI of LAD in February of 2014, presenting for follow-up. S/p NSTEMI, and is s/p PCI of OM with DESIREE by Dr. Brink. Since the last visit, the patient reports no chest pain, dyspnea, orthopnea or PND. Unable to loose weight.  He quit smoking after the MI and has been tobacco free for 90 days.   Was seen by pulmonary, was diagnosed with GEO, started on CPAP. Also has interstitial lung disease on CT chest, being w/u for interstitial pneumonitis. Following with pulmonary. He had labs done at Dr. Roth's office, LDL and HgA1c were at goal.  Cath report reviewed. Patent stent in LAD. Distal LAD moderate lesion. OM - severe lesion, successfully stented with DESIREE.  [Follow-Up - Clinic] : a clinic follow-up of [Coronary Artery Disease] : coronary artery disease

## 2021-12-27 ENCOUNTER — EMERGENCY (EMERGENCY)
Facility: HOSPITAL | Age: 51
LOS: 0 days | Discharge: HOME | End: 2021-12-27
Attending: EMERGENCY MEDICINE | Admitting: EMERGENCY MEDICINE
Payer: COMMERCIAL

## 2021-12-27 VITALS
TEMPERATURE: 98 F | HEART RATE: 70 BPM | OXYGEN SATURATION: 95 % | SYSTOLIC BLOOD PRESSURE: 171 MMHG | DIASTOLIC BLOOD PRESSURE: 94 MMHG | RESPIRATION RATE: 18 BRPM

## 2021-12-27 VITALS — HEIGHT: 69 IN

## 2021-12-27 DIAGNOSIS — Z90.49 ACQUIRED ABSENCE OF OTHER SPECIFIED PARTS OF DIGESTIVE TRACT: Chronic | ICD-10-CM

## 2021-12-27 DIAGNOSIS — E78.5 HYPERLIPIDEMIA, UNSPECIFIED: ICD-10-CM

## 2021-12-27 DIAGNOSIS — Z79.82 LONG TERM (CURRENT) USE OF ASPIRIN: ICD-10-CM

## 2021-12-27 DIAGNOSIS — Z95.5 PRESENCE OF CORONARY ANGIOPLASTY IMPLANT AND GRAFT: ICD-10-CM

## 2021-12-27 DIAGNOSIS — Z95.5 PRESENCE OF CORONARY ANGIOPLASTY IMPLANT AND GRAFT: Chronic | ICD-10-CM

## 2021-12-27 DIAGNOSIS — U07.1 COVID-19: ICD-10-CM

## 2021-12-27 DIAGNOSIS — I25.10 ATHEROSCLEROTIC HEART DISEASE OF NATIVE CORONARY ARTERY WITHOUT ANGINA PECTORIS: ICD-10-CM

## 2021-12-27 LAB
ALBUMIN SERPL ELPH-MCNC: 4.1 G/DL — SIGNIFICANT CHANGE UP (ref 3.5–5.2)
ALP SERPL-CCNC: 142 U/L — HIGH (ref 30–115)
ALT FLD-CCNC: 28 U/L — SIGNIFICANT CHANGE UP (ref 0–41)
ANION GAP SERPL CALC-SCNC: 16 MMOL/L — HIGH (ref 7–14)
AST SERPL-CCNC: 14 U/L — SIGNIFICANT CHANGE UP (ref 0–41)
BILIRUB SERPL-MCNC: 0.4 MG/DL — SIGNIFICANT CHANGE UP (ref 0.2–1.2)
BUN SERPL-MCNC: 19 MG/DL — SIGNIFICANT CHANGE UP (ref 10–20)
CALCIUM SERPL-MCNC: 9 MG/DL — SIGNIFICANT CHANGE UP (ref 8.5–10.1)
CHLORIDE SERPL-SCNC: 97 MMOL/L — LOW (ref 98–110)
CO2 SERPL-SCNC: 20 MMOL/L — SIGNIFICANT CHANGE UP (ref 17–32)
CREAT SERPL-MCNC: 0.9 MG/DL — SIGNIFICANT CHANGE UP (ref 0.7–1.5)
GLUCOSE SERPL-MCNC: 362 MG/DL — HIGH (ref 70–99)
HCT VFR BLD CALC: 45.4 % — SIGNIFICANT CHANGE UP (ref 42–52)
HGB BLD-MCNC: 15.6 G/DL — SIGNIFICANT CHANGE UP (ref 14–18)
MCHC RBC-ENTMCNC: 28.9 PG — SIGNIFICANT CHANGE UP (ref 27–31)
MCHC RBC-ENTMCNC: 34.4 G/DL — SIGNIFICANT CHANGE UP (ref 32–37)
MCV RBC AUTO: 84.1 FL — SIGNIFICANT CHANGE UP (ref 80–94)
NRBC # BLD: 0 /100 WBCS — SIGNIFICANT CHANGE UP (ref 0–0)
PLATELET # BLD AUTO: 284 K/UL — SIGNIFICANT CHANGE UP (ref 130–400)
POTASSIUM SERPL-MCNC: 4.2 MMOL/L — SIGNIFICANT CHANGE UP (ref 3.5–5)
POTASSIUM SERPL-SCNC: 4.2 MMOL/L — SIGNIFICANT CHANGE UP (ref 3.5–5)
PROT SERPL-MCNC: 6.4 G/DL — SIGNIFICANT CHANGE UP (ref 6–8)
RBC # BLD: 5.4 M/UL — SIGNIFICANT CHANGE UP (ref 4.7–6.1)
RBC # FLD: 11.9 % — SIGNIFICANT CHANGE UP (ref 11.5–14.5)
SODIUM SERPL-SCNC: 133 MMOL/L — LOW (ref 135–146)
TROPONIN T SERPL-MCNC: <0.01 NG/ML — SIGNIFICANT CHANGE UP
WBC # BLD: 13.29 K/UL — HIGH (ref 4.8–10.8)
WBC # FLD AUTO: 13.29 K/UL — HIGH (ref 4.8–10.8)

## 2021-12-27 PROCEDURE — 99285 EMERGENCY DEPT VISIT HI MDM: CPT

## 2021-12-27 PROCEDURE — 93010 ELECTROCARDIOGRAM REPORT: CPT

## 2021-12-27 NOTE — ED PROVIDER NOTE - CLINICAL SUMMARY MEDICAL DECISION MAKING FREE TEXT BOX
Pt here for evaluation of low heartrate in setting of being home with covid. Heartrate here is 70. Screening enzymes negative. D/c home. Pt in no distress. Pt chest tighness only with wearing a mask.

## 2021-12-27 NOTE — ED PROVIDER NOTE - OBJECTIVE STATEMENT
51 year old male with pmhx of cad with stents presents with positive covid. Pt tested positive on 12/21. Pt admits took his HR at home and it was 56 and got nervous so came to ed. pt denies sob, fever, chills, cough, abd pain or nausea, vomiting, diarrhea. pt admits when he got to ED and put his mask on had an episode of chest tightness which now resolved. pt had normal nuclear stress test in Jan.

## 2021-12-27 NOTE — ED PROVIDER NOTE - PROGRESS NOTE DETAILS
ATTENDING NOTE:  50 y/o M, Hx stents, here for eval of positive covid test. Pt tested positive on 12/21/2021. Pt notes he took his heartrate at home and it was low so he came to the ED for evaluation. Of note, once the pt put a mask on here in the ED he began to feel chest tightness but once he took the mask off Sx resolved and improved. Pt had a negative nuclear stress test in 01/2021.   CON: ao x 3, HENMT: clear oropharynx,  neck supple,  CV: rrr, equal pulses b/l, RESP: cta b/l, GI:  soft, nontender, no rebound, no guarding, SKIN: no rash, MSK: no deformities, NEURO: no gross motor or sensory deficit Psychiatric: appropriate mood, appropriate affect  Pt here for evaluation of low heartrate. Heartrate here is 70. Screening enzymes [id number]. D/c home. Pt in no distress. ATTENDING NOTE:  52 y/o M, Hx stents, here for eval of positive covid test. Pt tested positive on 12/21/2021. Pt notes he took his heartrate at home and it was low so he came to the ED for evaluation. Of note, once the pt put a mask on here in the ED he began to feel chest tightness but once he took the mask off Sx resolved and improved. Pt had a negative nuclear stress test in 01/2021.   CON: ao x 3, HENMT: clear oropharynx,  neck supple,  CV: rrr, equal pulses b/l, RESP: cta b/l, GI:  soft, nontender, no rebound, no guarding, SKIN: no rash, MSK: no deformities, NEURO: no gross motor or sensory deficit Psychiatric: appropriate mood, appropriate affect  Pt here for evaluation of low heartrate in setting of being home with covid. Heartrate here is 70. Screening enzymes negative. D/c home. Pt in no distress. Pt chest tighness only with wearing a mask.

## 2021-12-27 NOTE — ED PROVIDER NOTE - PATIENT PORTAL LINK FT
You can access the FollowMyHealth Patient Portal offered by [facility] by registering at the following website: [email or web address]. By joining [facility]’s FollowMyHealth portal, you will also be able to view your health information using other applications (apps) compatible with our system.

## 2022-01-12 RX ORDER — PRASUGREL 10 MG/1
10 TABLET, FILM COATED ORAL DAILY
Qty: 90 | Refills: 2 | Status: ACTIVE | COMMUNITY
Start: 1900-01-01 | End: 1900-01-01

## 2022-01-12 NOTE — H&P ADULT - HISTORY OF PRESENT ILLNESS
49 year old male patient with past medical history of coronary artery disease s/p stent in 2014, hyperlipidemia, obstructive sleep apnea on CPAP at home, hypersensitivity pneumonitis work-related (works as a ), presenting from for chest pain   The patient states that his pain started yesterday evening at home while he was watching the television, the pain is described as pressure sensation, radiates to the back and to the left upper extremity, and is constant. The patient states that he was able to sleep through the pain however 2 hours after he woke up this morning his pain worsened and was rated as 7 - 8 / 10 at the time. The pain is worse on exertion , associated with dyspnea and is improved at rest. He denies any associated fever /; chills, cough, sputum production, palpitations or dizziness. He states that for 2 weeks he noticed that his left lower extremity was more swollen than his right lower extremity but denies any erythema or pain at the site.   The patient had a stent placed in 2014 and had a treadmill stress test done last year in May, which was normal, and since then has not experienced any chest pain or angina.     In the ED : /79, HR 78, RR 20, Temp 98.8, SpO2 96 % on room air. Troponin T negative and ECG with no ischemic changes, the patient was transferred to the [facility] site given strong suspicion for acute coronary syndrome Quinolones Counseling:  I discussed with the patient the risks of fluoroquinolones including but not limited to GI upset, allergic reaction, drug rash, diarrhea, dizziness, photosensitivity, yeast infections, liver function test abnormalities, tendonitis/tendon rupture.

## 2022-02-14 ENCOUNTER — RESULT CHARGE (OUTPATIENT)
Age: 52
End: 2022-02-14

## 2022-02-14 ENCOUNTER — APPOINTMENT (OUTPATIENT)
Dept: CARDIOLOGY | Facility: CLINIC | Age: 52
End: 2022-02-14
Payer: COMMERCIAL

## 2022-02-14 VITALS
HEART RATE: 81 BPM | HEIGHT: 69 IN | WEIGHT: 280 LBS | BODY MASS INDEX: 41.47 KG/M2 | SYSTOLIC BLOOD PRESSURE: 134 MMHG | DIASTOLIC BLOOD PRESSURE: 76 MMHG | TEMPERATURE: 97.5 F

## 2022-02-14 DIAGNOSIS — E11.9 TYPE 2 DIABETES MELLITUS W/OUT COMPLICATIONS: ICD-10-CM

## 2022-02-14 PROCEDURE — 99214 OFFICE O/P EST MOD 30 MIN: CPT

## 2022-02-14 PROCEDURE — 93000 ELECTROCARDIOGRAM COMPLETE: CPT

## 2022-02-14 RX ORDER — METFORMIN ER 750 MG 750 MG/1
750 TABLET ORAL
Qty: 60 | Refills: 0 | Status: ACTIVE | COMMUNITY
Start: 2022-02-02

## 2022-02-14 RX ORDER — EMPAGLIFLOZIN 25 MG/1
25 TABLET, FILM COATED ORAL
Qty: 30 | Refills: 0 | Status: ACTIVE | COMMUNITY
Start: 2022-02-02

## 2022-02-14 NOTE — PHYSICAL EXAM
[General Appearance - Well Nourished] : well nourished [General Appearance - Well Developed] : well developed [General Appearance - In No Acute Distress] : no acute distress [Normal Conjunctiva] : the conjunctiva exhibited no abnormalities [Normal Jugular Venous V Waves Present] : normal jugular venous V waves present [] : no respiratory distress [Exaggerated Use Of Accessory Muscles For Inspiration] : no accessory muscle use [Respiration, Rhythm And Depth] : normal respiratory rhythm and effort [Auscultation Breath Sounds / Voice Sounds] : lungs were clear to auscultation bilaterally [Heart Rate And Rhythm] : heart rate and rhythm were normal [Heart Sounds] : normal S1 and S2 [Murmurs] : no murmurs present [Arterial Pulses Normal] : the arterial pulses were normal [Edema] : no peripheral edema present [Bowel Sounds] : normal bowel sounds [Abdomen Soft] : soft [Abdomen Tenderness] : non-tender [Abnormal Walk] : normal gait [Nail Clubbing] : no clubbing of the fingernails [Cyanosis, Localized] : no localized cyanosis [Skin Color & Pigmentation] : normal skin color and pigmentation [Oriented To Time, Place, And Person] : oriented to person, place, and time [Affect] : the affect was normal [FreeTextEntry1] : NC/AT, PERRL

## 2022-02-14 NOTE — HISTORY OF PRESENT ILLNESS
[FreeTextEntry1] : 50 y/o male with history of HTN, DL, CAD, s/p PCI of LAD in February of 2014, presenting for follow-up. S/p NSTEMI, and is s/p PCI of OM with DESIREE by Dr. Brink. Since the last visit, the patient reports no chest pain, dyspnea, orthopnea or PND. Unable to loose weight.  He quit smoking after the MI and has been tobacco x 2 years   Was seen by pulmonary, was diagnosed with GEO, started on CPAP. Also has interstitial lung disease on CT chest, being w/u for interstitial pneumonitis. Followed  with pulmonary has been stable . Pt has  stent in LAD. Distal LAD moderate lesion. OM - severe lesion, successfully stented with DESIREE.  New dx DM Hgb A1C 10.2 started on meds.  Chol 165, TRIG 844 LDL 66

## 2022-02-14 NOTE — ASSESSMENT
[FreeTextEntry1] : CAD, s/p PCI of LAD with DESIREE. Now s/p PCI of OM\par Had a sleep test - diagnosed with GEO. Using CPAP.\par No angina.\par CT chest noted. Follow-up with pulmonary, ENT.\par Gained weight. Worsening DM\par Patient was advised about healthy lifestyle changes, including diet and exercise. Importance of sustained long-term weight loss was discussed, questions answered. Strategies discussed. Nutritionist evaluation recommended.\par Quit smoking - encouraged to stay off tobacco products.\par Secondary prevention discussed.\par Labs  reviewed. \par \par \par F/u in 6 months, or if any symptoms.

## 2022-02-14 NOTE — REASON FOR VISIT
[Symptom and Test Evaluation] : symptom and test evaluation [Coronary Artery Disease] : coronary artery disease [Follow-Up - Clinic] : a clinic follow-up of

## 2022-06-13 ENCOUNTER — NON-APPOINTMENT (OUTPATIENT)
Age: 52
End: 2022-06-13

## 2022-08-01 ENCOUNTER — APPOINTMENT (OUTPATIENT)
Dept: CARDIOLOGY | Facility: CLINIC | Age: 52
End: 2022-08-01

## 2022-08-01 ENCOUNTER — RESULT CHARGE (OUTPATIENT)
Age: 52
End: 2022-08-01

## 2022-08-01 VITALS
WEIGHT: 284 LBS | HEIGHT: 69 IN | DIASTOLIC BLOOD PRESSURE: 80 MMHG | BODY MASS INDEX: 42.06 KG/M2 | SYSTOLIC BLOOD PRESSURE: 130 MMHG | HEART RATE: 69 BPM

## 2022-08-01 DIAGNOSIS — Z87.891 PERSONAL HISTORY OF NICOTINE DEPENDENCE: ICD-10-CM

## 2022-08-01 DIAGNOSIS — Z82.49 FAMILY HISTORY OF ISCHEMIC HEART DISEASE AND OTHER DISEASES OF THE CIRCULATORY SYSTEM: ICD-10-CM

## 2022-08-01 DIAGNOSIS — F17.200 NICOTINE DEPENDENCE, UNSPECIFIED, UNCOMPLICATED: ICD-10-CM

## 2022-08-01 DIAGNOSIS — Z83.3 FAMILY HISTORY OF DIABETES MELLITUS: ICD-10-CM

## 2022-08-01 PROCEDURE — 93000 ELECTROCARDIOGRAM COMPLETE: CPT

## 2022-08-01 PROCEDURE — 99214 OFFICE O/P EST MOD 30 MIN: CPT | Mod: 25

## 2022-08-01 RX ORDER — CLOBETASOL PROPIONATE 0.5 MG/G
0.05 OINTMENT TOPICAL
Qty: 180 | Refills: 0 | Status: ACTIVE | COMMUNITY
Start: 2022-03-31

## 2022-08-01 NOTE — PHYSICAL EXAM
[General Appearance - Well Developed] : well developed [General Appearance - Well Nourished] : well nourished [General Appearance - In No Acute Distress] : no acute distress [Normal Conjunctiva] : the conjunctiva exhibited no abnormalities [Normal Jugular Venous V Waves Present] : normal jugular venous V waves present [] : no respiratory distress [Respiration, Rhythm And Depth] : normal respiratory rhythm and effort [Exaggerated Use Of Accessory Muscles For Inspiration] : no accessory muscle use [Auscultation Breath Sounds / Voice Sounds] : lungs were clear to auscultation bilaterally [Heart Sounds] : normal S1 and S2 [Heart Rate And Rhythm] : heart rate and rhythm were normal [Murmurs] : no murmurs present [Arterial Pulses Normal] : the arterial pulses were normal [Edema] : no peripheral edema present [Bowel Sounds] : normal bowel sounds [Abdomen Soft] : soft [Abdomen Tenderness] : non-tender [Abnormal Walk] : normal gait [Nail Clubbing] : no clubbing of the fingernails [Cyanosis, Localized] : no localized cyanosis [Skin Color & Pigmentation] : normal skin color and pigmentation [Oriented To Time, Place, And Person] : oriented to person, place, and time [Affect] : the affect was normal [FreeTextEntry1] : NC/AT, PERRL

## 2022-08-01 NOTE — ASSESSMENT
[FreeTextEntry1] : CAD, s/p PCI of LAD with DESIREE. Now s/p PCI of OM\par Had a sleep test - diagnosed with GEO. Using CPAP.\par R/o angina.\par CT chest noted. Follow-up with pulmonary, ENT.\par Gained weight. Type II DM - A1c improved with therapy\par Lipid control discussed. C/w statin, increase Omega 3.\par Needs weight loss. Options discussed.\par Schedule for stress echo to assess for ischemia\par Quit smoking - encouraged to stay off tobacco products.\par Secondary prevention discussed.\par Labs  reviewed. \par \par \par F/u in 6 months, or if any symptoms.

## 2022-08-01 NOTE — HISTORY OF PRESENT ILLNESS
[FreeTextEntry1] : 50 y/o male with history of HTN, DL, CAD, s/p PCI of LAD in February of 2014,  S/p NSTEMI, and is s/p PCI of OM with DESIREE by Dr. Brink. Since the last visit, the patient reports no chest pain, mild dyspnea, no orthopnea or PND. Unable to loose weight.  He quit smoking after the MI and has been tobacco x 2 years   Was seen by pulmonary, was diagnosed with GEO, started on CPAP. Also has interstitial lung disease on CT chest, being w/u for interstitial pneumonitis. Followed  with pulmonary has been stable . Pt has  stent in LAD. Distal LAD moderate lesion. OM - severe lesion, successfully stented with DESIREE.  New dx DM Hgb A1C 10.2 started on meds, improved to 6.4   Chol 138 improved from 165, TRIG 384 improved from  844 LDL  54 improved from 66.  Pt c.o new pain in B knees, ankles, no muscle pain.

## 2022-08-24 ENCOUNTER — APPOINTMENT (OUTPATIENT)
Dept: CARDIOLOGY | Facility: CLINIC | Age: 52
End: 2022-08-24

## 2022-08-24 PROCEDURE — 93351 STRESS TTE COMPLETE: CPT

## 2022-08-24 PROCEDURE — 93320 DOPPLER ECHO COMPLETE: CPT

## 2022-08-24 PROCEDURE — 93325 DOPPLER ECHO COLOR FLOW MAPG: CPT

## 2022-08-26 ENCOUNTER — EMERGENCY (EMERGENCY)
Facility: HOSPITAL | Age: 52
LOS: 0 days | Discharge: HOME | End: 2022-08-26
Attending: STUDENT IN AN ORGANIZED HEALTH CARE EDUCATION/TRAINING PROGRAM | Admitting: STUDENT IN AN ORGANIZED HEALTH CARE EDUCATION/TRAINING PROGRAM

## 2022-08-26 VITALS
TEMPERATURE: 98 F | HEIGHT: 69 IN | RESPIRATION RATE: 18 BRPM | WEIGHT: 283.07 LBS | SYSTOLIC BLOOD PRESSURE: 160 MMHG | OXYGEN SATURATION: 99 % | HEART RATE: 96 BPM | DIASTOLIC BLOOD PRESSURE: 83 MMHG

## 2022-08-26 DIAGNOSIS — G47.33 OBSTRUCTIVE SLEEP APNEA (ADULT) (PEDIATRIC): ICD-10-CM

## 2022-08-26 DIAGNOSIS — E78.5 HYPERLIPIDEMIA, UNSPECIFIED: ICD-10-CM

## 2022-08-26 DIAGNOSIS — I25.10 ATHEROSCLEROTIC HEART DISEASE OF NATIVE CORONARY ARTERY WITHOUT ANGINA PECTORIS: ICD-10-CM

## 2022-08-26 DIAGNOSIS — S61.412A LACERATION WITHOUT FOREIGN BODY OF LEFT HAND, INITIAL ENCOUNTER: ICD-10-CM

## 2022-08-26 DIAGNOSIS — Z95.5 PRESENCE OF CORONARY ANGIOPLASTY IMPLANT AND GRAFT: Chronic | ICD-10-CM

## 2022-08-26 DIAGNOSIS — Z95.5 PRESENCE OF CORONARY ANGIOPLASTY IMPLANT AND GRAFT: ICD-10-CM
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
## 2022-08-26 DIAGNOSIS — Z90.49 ACQUIRED ABSENCE OF OTHER SPECIFIED PARTS OF DIGESTIVE TRACT: Chronic | ICD-10-CM

## 2022-08-26 PROCEDURE — 99283 EMERGENCY DEPT VISIT LOW MDM: CPT | Mod: 25

## 2022-08-26 PROCEDURE — 12001 RPR S/N/AX/GEN/TRNK 2.5CM/<: CPT

## 2022-08-26 NOTE — ED PROVIDER NOTE - OBJECTIVE STATEMENT
51 yo M pmhx cad s/p stents, roxy, hld presenting to the ED for evaluation of lac to L hand 1 hour ago. pt was cutting with a knife and knife slipped causing lac to dorsum of L hand, states he applied pressure unable to control bleeding. denies numbness/tingling, weakness. utd tdap.

## 2022-08-26 NOTE — ED PROVIDER NOTE - PHYSICAL EXAMINATION
GENERAL: Well-nourished, Well-developed. NAD.  HEAD: No visible or palpable bumps or hematomas. No ecchymosis behind ears B/L.  ENMT: MMM.   Neck: Supple. FROM  CVS: RRR  MSK: FROM all fingers L hand.   Skin: (+)L hand dorsum 2 cm linear laceration, no nerve or tendon involvement.   EXT: Radial and pedal pulses present B/L.   Neuro: NVI

## 2022-08-26 NOTE — ED PROVIDER NOTE - NS ED ATTENDING STATEMENT MOD
This was a shared visit with the AIYANA. I reviewed and verified the documentation and independently performed the documented:

## 2022-08-26 NOTE — ED PROVIDER NOTE - NS ED ROS FT
Constitutional: No fever, chills.  Eyes:  No visual changes  ENMT:  No neck pain  Cardiac:  No chest pain  Respiratory:  No cough, SOB  MS:  No back pain.  Neuro:  No numbness/tingling or weakness  Skin:  (+)L hand lac  Endocrine: No history of thyroid disease or diabetes.  Except as documented in the HPI,  all other systems are negative.

## 2022-08-26 NOTE — ED PROVIDER NOTE - NSFOLLOWUPINSTRUCTIONS_ED_ALL_ED_FT
**Return to the ED in 7-10 days for suture removal**    Laceration    A laceration is a cut that goes through all of the layers of the skin and into the tissue that is right under the skin. Some lacerations heal on their own. Others need to be closed with skin adhesive strips, skin glue, stitches (sutures), or staples. Proper laceration care minimizes the risk of infection and helps the laceration to heal better.  If non-absorbable stitches or staples have been placed, they must be taken out within the time frame instructed by your healthcare provider.    SEEK IMMEDIATE MEDICAL CARE IF YOU HAVE ANY OF THE FOLLOWING SYMPTOMS: swelling around the wound, worsening pain, drainage from the wound, red streaking going away from your wound, inability to move finger or toe near the laceration, or discoloration of skin near the laceration.

## 2022-08-26 NOTE — ED PROVIDER NOTE - PATIENT PORTAL LINK FT
You can access the FollowMyHealth Patient Portal offered by [facility] by registering at the following website: [email or web address]. By joining [facility]’s FollowMyHealth portal, you will also be able to view your health information using other applications (apps) compatible with our system.

## 2022-08-26 NOTE — ED PROVIDER NOTE - CLINICAL SUMMARY MEDICAL DECISION MAKING FREE TEXT BOX
52-year-old male presenting today with laceration.  Patient's bleeding was controlled.  Laceration was repaired.  Patient discharged to follow-up for laceration care and suture removal. Patient is neurovascularly intact.

## 2022-09-28 ENCOUNTER — APPOINTMENT (OUTPATIENT)
Age: 52
End: 2022-09-28

## 2022-09-28 VITALS
RESPIRATION RATE: 14 BRPM | WEIGHT: 280 LBS | OXYGEN SATURATION: 98 % | HEART RATE: 108 BPM | SYSTOLIC BLOOD PRESSURE: 144 MMHG | DIASTOLIC BLOOD PRESSURE: 84 MMHG | BODY MASS INDEX: 41.47 KG/M2 | HEIGHT: 69 IN

## 2022-09-28 PROCEDURE — 94010 BREATHING CAPACITY TEST: CPT

## 2022-09-28 PROCEDURE — 99203 OFFICE O/P NEW LOW 30 MIN: CPT | Mod: 25

## 2022-09-28 NOTE — HISTORY OF PRESENT ILLNESS
[Initial Evaluation] : an initial evaluation of [Snoring] : snoring [Unrefreshing Sleep] : unrefreshing sleep [Currently Experiencing] : The patient is currently experiencing symptoms. [None] : No associated symptoms are reported [CPAP] : CPAP [Good Tolerance] : good tolerance of treatment [Poor Symptom Control] : poor symptom control

## 2022-09-28 NOTE — ASSESSMENT
[FreeTextEntry1] : HO GEO On CPAP 12 cm h2O\par Probable inappropriate settings \par HO small lung nodules \par HO 30 PY smoking quit 2 years ago

## 2022-10-19 ENCOUNTER — EMERGENCY (EMERGENCY)
Facility: HOSPITAL | Age: 52
LOS: 0 days | Discharge: HOME | End: 2022-10-19
Attending: EMERGENCY MEDICINE | Admitting: EMERGENCY MEDICINE

## 2022-10-19 VITALS
RESPIRATION RATE: 20 BRPM | HEART RATE: 91 BPM | TEMPERATURE: 98 F | OXYGEN SATURATION: 97 % | DIASTOLIC BLOOD PRESSURE: 76 MMHG | HEIGHT: 69 IN | WEIGHT: 279.99 LBS | SYSTOLIC BLOOD PRESSURE: 143 MMHG

## 2022-10-19 DIAGNOSIS — R93.89 ABNORMAL FINDINGS ON DIAGNOSTIC IMAGING OF OTHER SPECIFIED BODY STRUCTURES: ICD-10-CM

## 2022-10-19 DIAGNOSIS — Z79.82 LONG TERM (CURRENT) USE OF ASPIRIN: ICD-10-CM
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
## 2022-10-19 DIAGNOSIS — S09.90XA UNSPECIFIED INJURY OF HEAD, INITIAL ENCOUNTER: ICD-10-CM

## 2022-10-19 DIAGNOSIS — Z79.02 LONG TERM (CURRENT) USE OF ANTITHROMBOTICS/ANTIPLATELETS: ICD-10-CM

## 2022-10-19 DIAGNOSIS — I25.10 ATHEROSCLEROTIC HEART DISEASE OF NATIVE CORONARY ARTERY WITHOUT ANGINA PECTORIS: ICD-10-CM

## 2022-10-19 DIAGNOSIS — S01.21XA LACERATION WITHOUT FOREIGN BODY OF NOSE, INITIAL ENCOUNTER: ICD-10-CM

## 2022-10-19 DIAGNOSIS — Z90.49 ACQUIRED ABSENCE OF OTHER SPECIFIED PARTS OF DIGESTIVE TRACT: ICD-10-CM
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
## 2022-10-19 DIAGNOSIS — Z90.49 ACQUIRED ABSENCE OF OTHER SPECIFIED PARTS OF DIGESTIVE TRACT: Chronic | ICD-10-CM

## 2022-10-19 DIAGNOSIS — E78.5 HYPERLIPIDEMIA, UNSPECIFIED: ICD-10-CM

## 2022-10-19 DIAGNOSIS — G47.33 OBSTRUCTIVE SLEEP APNEA (ADULT) (PEDIATRIC): ICD-10-CM

## 2022-10-19 DIAGNOSIS — Z95.5 PRESENCE OF CORONARY ANGIOPLASTY IMPLANT AND GRAFT: Chronic | ICD-10-CM

## 2022-10-19 DIAGNOSIS — Z95.5 PRESENCE OF CORONARY ANGIOPLASTY IMPLANT AND GRAFT: ICD-10-CM

## 2022-10-19 PROCEDURE — 99284 EMERGENCY DEPT VISIT MOD MDM: CPT

## 2022-10-19 PROCEDURE — 70450 CT HEAD/BRAIN W/O DYE: CPT | Mod: 26,MA

## 2022-10-19 RX ORDER — LIDOCAINE HYDROCHLORIDE AND EPINEPHRINE 10; 10 MG/ML; UG/ML
10 INJECTION, SOLUTION INFILTRATION; PERINEURAL ONCE
Refills: 0 | Status: COMPLETED | OUTPATIENT
Start: 2022-10-19 | End: 2022-10-19

## 2022-10-19 RX ADMIN — LIDOCAINE HYDROCHLORIDE AND EPINEPHRINE 10 MILLILITER(S): 10; 10 INJECTION, SOLUTION INFILTRATION; PERINEURAL at 22:04

## 2022-10-19 NOTE — ED ADULT NURSE NOTE - NS ED PATIENT SAFETY CONCERN
No Implemented All Universal Safety Interventions:  [place] to call system. Call bell, personal items and telephone within reach. Instruct patient to call for assistance. Room bathroom lighting operational. Non-slip footwear when patient is off stretcher. Physically safe environment: no spills, clutter or unnecessary equipment. Stretcher in lowest position, wheels locked, appropriate side rails in place.

## 2022-10-19 NOTE — ED PROVIDER NOTE - PATIENT PORTAL LINK FT
You can access the FollowMyHealth Patient Portal offered by [facility] by registering at the following website: [email or web address]. By joining [facility]’s FollowMyHealth portal, you will also be able to view your health information using other applications (apps) compatible with our system.
none

## 2022-10-19 NOTE — ED PROVIDER NOTE - PHYSICAL EXAMINATION
Physical Exam    Vital Signs: I have reviewed the initial vital signs.  Constitutional: well-nourished, appears stated age, no acute distress  Eyes: Conjunctiva pink, Sclera clear, PERRLA, EOMI.  Cardiovascular: S1 and S2, regular rate, regular rhythm, well-perfused extremities, radial pulses equal and 2+  Respiratory: unlabored respiratory effort, clear to auscultation bilaterally no wheezing, rales and rhonchi  Gastrointestinal: soft, non-tender abdomen, no pulsatile mass, normal bowl sounds  Musculoskeletal: supple neck, no lower extremity edema, no midline tenderness  Integumentary: warm, dry, + deep abrasion forehead + 1.6cm flap lac to nose  Neurologic: awake, alert, cranial nerves II-XII grossly intact, extremities’ motor and sensory functions grossly intact  Psychiatric: appropriate mood, appropriate affect

## 2022-10-19 NOTE — ED PROVIDER NOTE - NSFOLLOWUPINSTRUCTIONS_ED_ALL_ED_FT
Follow up with your primary care doctor or ER in 5 days for suture removal.     Follow up with your primary care doctor for head injury in 1-2 day s    Follow up with neurology and primary care doctor for Medial right cerebellar hypodensity possibly representing age-indeterminate infarct versus retrocerebellar cyst. MRI brain may be obtained for complete evaluation.    Head Injury    WHAT YOU NEED TO KNOW:    A head injury is most often caused by a blow to the head. This may occur from a fall, bicycle injury, sports injury, being struck in the head, or a motor vehicle accident.     DISCHARGE INSTRUCTIONS:    Call 911 or have someone else call for any of the following:     You cannot be woken.      You have a seizure.      You stop responding to others or you faint.      You have blurry or double vision.      Your speech becomes slurred or confused.      You have arm or leg weakness, loss of feeling, or new problems with coordination.      Your pupils are larger than usual or one pupil is a different size than the other.       You have blood or clear fluid coming out of your ears or nose.    Return to the emergency department if:     You have repeated or forceful vomiting.      You feel confused.      Your headache gets worse or becomes severe.      You or someone caring for you notices that you are harder to wake than usual.    Contact your healthcare provider if:     Your symptoms last longer than 6 weeks after the injury.      You have questions or concerns about your condition or care.    Medicines:     Acetaminophen decreases pain. Acetaminophen is available without a doctor's order. Ask how much to take and how often to take it. Follow directions. Acetaminophen can cause liver damage if not taken correctly.      Take your medicine as directed. Contact your healthcare provider if you think your medicine is not helping or if you have side effects. Tell him or her if you are allergic to any medicine. Keep a list of the medicines, vitamins, and herbs you take. Include the amounts, and when and why you take them. Bring the list or the pill bottles to follow-up visits. Carry your medicine list with you in case of an emergency.    Self-care:     Rest or do quiet activities for 24 to 48 hours. Limit your time watching TV, using the computer, or doing tasks that require a lot of thinking. Slowly return to your normal activities as directed. Do not play sports or do activities that may cause you to get hit in the head. Ask your healthcare provider when you can return to sports.       Apply ice on your head for 15 to 20 minutes every hour or as directed. Use an ice pack, or put crushed ice in a plastic bag. Cover it with a towel before you apply it to your skin. Ice helps prevent tissue damage and decreases swelling and pain.       Have someone stay with you for 24 hours or as directed. This person can monitor you for complications and call 911. When you are awake the person should ask you a few questions to see if you are thinking clearly. An example would be to ask your name or your address.     Prevent another head injury:     Wear a helmet that fits properly. Do this when you play sports, or ride a bike, scooter, or skateboard. Helmets help decrease your risk of a serious head injury. Talk to your healthcare provider about other ways you can protect yourself if you play sports.      Wear your seat belt every time you are in a car. This helps to decrease your risk for a head injury if you are in a car accident.     Follow up with your healthcare provider as directed: Write down your questions so you remember to ask them during your visits.        © Copyright [employer] 2019 All illustrations and images included in CareNotes are the copyrighted property of [employer]. or [employer].        Facial Laceration    WHAT YOU NEED TO KNOW:    A facial laceration is a tear or cut in the skin caused by blunt or shearing forces, or sharp objects. Facial lacerations may be closed within 24 hours of injury.    DISCHARGE INSTRUCTIONS:    Return to the emergency department if:     You have a fever and the wound is painful, warm, or swollen. The wound area may be red, or fluid may come out of it.      You have heavy bleeding or bleeding that does not stop after 10 minutes of holding firm, direct pressure over the wound.    Contact your healthcare provider if:     Your wound reopens or your tape comes off.      Your wound is very painful.      Your wound is not healing, or you think there is an object in the wound.      The skin around your wound stays numb.      You have questions or concerns about your condition or care.    Medicines:     Antibiotics may be given to prevent an infection if your wound was deep and had to be cleaned out.       Take your medicine as directed. Contact your healthcare provider if you think your medicine is not helping or if you have side effects. Tell him of her if you are allergic to any medicine. Keep a list of the medicines, vitamins, and herbs you take. Include the amounts, and when and why you take them. Bring the list or the pill bottles to follow-up visits. Carry your medicine list with you in case of an emergency.    Care for your wound: Care for your wound as directed to prevent infection and help it heal. Wash your hands with soap and warm water before and after you care for your wound. You may need to keep the wound dry for the first 24 to 48 hours. When your healthcare provider says it is okay, wash around your wound with soap and water, or as directed. Gently pat the area dry. Do not use alcohol or hydrogen peroxide to clean your wound unless you are directed to.    Do not take aspirin or NSAIDs for 24 hours after being injured. Aspirin and NSAIDs can increase blood flow. Your laceration may continue

## 2022-10-19 NOTE — ED PROVIDER NOTE - CARE PLAN
1 Principal Discharge DX:	Head injury  Secondary Diagnosis:	Skin avulsion  Secondary Diagnosis:	Laceration of nose  Secondary Diagnosis:	Abnormal CT scan of head

## 2022-10-19 NOTE — ED PROVIDER NOTE - ATTENDING APP SHARED VISIT CONTRIBUTION OF CARE
I was present for and supervised the key and critical aspects of the procedures performed during the care of the patient. He had patient is a 52-year-old male past medical history of CAD on Plavix presents emergency department for evaluation of head injury and laceration after tripping into the sink no LOC no vomiting.  Patient is able to ambulate well no other issues at this time sustained a laceration to the forehead    On physical exam patient has a 3 cm horizontal laceration to the forehead as well as superficial abrasion to the bridge of the nose no nagel sign or pruritus no septal hematoma noted oropharynx clear chest clear to auscultation bilaterally abdomen soft nontender nondistended bowel sounds positive no guarding or rebound extremities full range of motion is able to ambulate well with no focal deficits    Assessment plan patient underwent successful laceration repair stable for discharge we discussed close head injury precautions concussion protocols he is aware I will discharge at this time

## 2022-10-19 NOTE — ED PROVIDER NOTE - OBJECTIVE STATEMENT
52 year old male past medical history of Coronary Artery Disease on ASA and plavix comes to emergency room for head injury. patient states that he tripped and fell and hit nose and head on sink. No loss of consciousness. patient came home and than came to emergency room because bleeding didn't stop.

## 2022-10-19 NOTE — ED PROVIDER NOTE - CARE PROVIDER_API CALL
Chun Crawford)  Neurology  [address] [place] [address], [address]  [place] [address]  Phone: [phone]  Fax: [phone]  Follow Up Time:

## 2022-10-19 NOTE — ED PROVIDER NOTE - WET READ LAUNCH FT
requesting liquid form of antibiotic for a UTI seen at [place] ED this morning There are no Wet Read(s) to document.

## 2022-10-26 ENCOUNTER — EMERGENCY (EMERGENCY)
Facility: HOSPITAL | Age: 52
LOS: 0 days | Discharge: HOME | End: 2022-10-26
Attending: EMERGENCY MEDICINE | Admitting: EMERGENCY MEDICINE

## 2022-10-26 VITALS
TEMPERATURE: 96 F | DIASTOLIC BLOOD PRESSURE: 64 MMHG | RESPIRATION RATE: 18 BRPM | SYSTOLIC BLOOD PRESSURE: 139 MMHG | HEART RATE: 89 BPM | WEIGHT: 279.99 LBS | OXYGEN SATURATION: 96 % | HEIGHT: 69 IN

## 2022-10-26 DIAGNOSIS — S01.21XD LACERATION WITHOUT FOREIGN BODY OF NOSE, SUBSEQUENT ENCOUNTER: ICD-10-CM
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
## 2022-10-26 DIAGNOSIS — Z90.49 ACQUIRED ABSENCE OF OTHER SPECIFIED PARTS OF DIGESTIVE TRACT: Chronic | ICD-10-CM

## 2022-10-26 DIAGNOSIS — Z95.5 PRESENCE OF CORONARY ANGIOPLASTY IMPLANT AND GRAFT: Chronic | ICD-10-CM

## 2022-10-26 PROCEDURE — L9995: CPT

## 2022-10-26 NOTE — ED PROCEDURE NOTE - PROCEDURE ADDITIONAL DETAILS
D/w patient on first encounter about possibility of skin flap not taking and was aware of complication. D/w patient after care including keeping in clean and watching for infection and keep out of sun. patient aware and will f/u with pmd

## 2022-10-26 NOTE — ED PROVIDER NOTE - PHYSICAL EXAMINATION
Physical Exam    Vital Signs: I have reviewed the initial vital signs.  Constitutional: well-nourished, appears stated age, no acute distress  Musculoskeletal: supple neck, no lower extremity edema, no midline tenderness  Integumentary: warm, dry, + sutures in place with partial scabbed over wound with some flap necrosis present no bleeding no redness no signs of infection.   Neurologic: awake, alert, extremities’ motor and sensory functions grossly intact  Psychiatric: appropriate mood, appropriate affect

## 2022-10-26 NOTE — ED PROVIDER NOTE - NS ED ATTENDING STATEMENT MOD
I have seen and examined this patient and fully participated in the care of this patient as the teaching attending.  The service was shared with the AIYANA.  I reviewed and verified the documentation and independently performed the documented:

## 2022-10-26 NOTE — ED PROVIDER NOTE - NSFOLLOWUPINSTRUCTIONS_ED_ALL_ED_FT
Follow up with your primary care doctor in 1-2 days    Stitches Removal    WHAT YOU NEED TO KNOW:    Stitches are usually removed within 14 days, depending on the location of the wound. Your healthcare provider will tell you when to return to have your stitches removed. Your provider will use sterile forceps or tweezers to  the knot of each stitch. He or she will cut the stitch with scissors and pull the stitch out. You may feel a slight tug as the stitch comes out.    DISCHARGE INSTRUCTIONS:    Return to the emergency department if:     Your wound splits open or is starting to come apart.      You suddenly cannot move your injured joint.      You have sudden numbness around your wound.      You see red streaks coming from your wound.    Contact your healthcare provider if:     You have a fever and chills.      Your wound is red, warm, swollen, or leaking pus.      There is a bad smell coming from your wound.      You have increased pain in the wound area.      You have questions or concerns about your condition or care.    Care for the area after the stitches are removed:     Do not pull medical tape off. Your provider may place small strips of medical tape across your wound after the stitches have been removed. These strips will peel and fall of on their own. Do not pull them off.      Clean the area as directed. Carefully wash the area with soap and water. Pat the area dry with a clean towel. Check the area for signs of infection, such as redness, swelling, or pus. Also check that the wound is not coming apart.      Protect your wound. Your wound can swell, bleed, or split open if it is stretched or bumped. You may need to wear a bandage that supports your wound until it is completely healed.      Care for a scar. You may have a scar after the stitches are removed. Use sunblock if the area is exposed to the sun. Apply it every day after the stitches are removed. This will help prevent skin discoloration. Talk to your healthcare provider about medicines you can use to make the scar less visible. Some medicines are available without a prescription.    Follow up with your healthcare provider as directed: You may need to return in 3 to 5 days if the stitches are on your face. Stitches on your scalp need to be removed after 7 to 14 days. Stitches over joints may remain in place up to 14 days. Write down your questions so you remember to ask them during your visits.        © Copyright [employer] 2019 All illustrations and images included in CareNotes are the copyrighted property of [employer] or [employer].

## 2022-10-26 NOTE — ED PROVIDER NOTE - OBJECTIVE STATEMENT
52 year old male comes to emergency room for suture removal. patient had sutures placed in emergency room 1 week ago after fall and hitting nose on sink. Pt states that he has been keep wound clean but wound edges started to peel and he doesn't think the flap is taking. no fever/chills.

## 2022-10-26 NOTE — ED PROVIDER NOTE - PATIENT PORTAL LINK FT
You can access the FollowMyHealth Patient Portal offered by [facility] by registering at the following website: [email or web address]. By joining [email or web address]’s FollowMyHealth portal, you will also be able to view your health information using other applications (apps) compatible with our system.

## 2022-10-26 NOTE — ED PROVIDER NOTE - CLINICAL SUMMARY MEDICAL DECISION MAKING FREE TEXT BOX
Pt  presents for suture removal  lac on nose 7 days ago - partial skin necrosis,  no sigsn of infection -  pt says he is aware of this complication  when laceration  was repaired .

## 2022-10-29 NOTE — ED PROCEDURE NOTE - CPROC ED INFORMED CONSENT1
Benefits, risks, and possible complications of procedure explained to patient/caregiver who verbalized understanding and gave verbal consent. normal (ped)...

## 2022-11-21 ENCOUNTER — APPOINTMENT (OUTPATIENT)
Dept: CARDIOLOGY | Facility: CLINIC | Age: 52
End: 2022-11-21

## 2022-11-21 ENCOUNTER — RESULT CHARGE (OUTPATIENT)
Age: 52
End: 2022-11-21

## 2022-11-21 VITALS
SYSTOLIC BLOOD PRESSURE: 122 MMHG | DIASTOLIC BLOOD PRESSURE: 76 MMHG | HEIGHT: 69 IN | BODY MASS INDEX: 41.77 KG/M2 | WEIGHT: 282 LBS | HEART RATE: 61 BPM

## 2022-11-21 PROCEDURE — 99214 OFFICE O/P EST MOD 30 MIN: CPT | Mod: 25

## 2022-11-21 PROCEDURE — 93000 ELECTROCARDIOGRAM COMPLETE: CPT

## 2022-11-21 NOTE — HISTORY OF PRESENT ILLNESS
[FreeTextEntry1] : 51 y/o male with history of HTN, DL, CAD, s/p PCI of LAD in February of 2014,  S/p NSTEMI, and is s/p PCI of OM with DESIREE by Dr. Brink. Since the last visit, the patient reports no chest pain, mild dyspnea, no orthopnea or PND. Unable to loose weight.  He quit smoking after the MI and has been tobacco x 2 years   Was seen by pulmonary, was diagnosed with GEO, started on CPAP. Also has interstitial lung disease on CT chest, being w/u for interstitial pneumonitis. Followed  with pulmonary has been stable . Pt has  stent in LAD. Distal LAD moderate lesion. OM - severe lesion, successfully stented with DESIREE.  New dx DM Hgb A1C 10.2 started on meds, improved to 6.4   Chol 138 improved from 165, TRIG 384 improved from  844 LDL  54 improved from 66.   \par 08/24/22 Exercise Stress Echo Ef 61% no ischemia \par S/p ER visit for head trauma\par CT of the head was done +  Medial right cerebellar hypodensity possibly representing \par age-indeterminate infarct versus retrocerebellar cyst. MRI of the brain was done and was negative.

## 2022-11-21 NOTE — PHYSICAL EXAM
[General Appearance - Well Developed] : well developed [General Appearance - Well Nourished] : well nourished [General Appearance - In No Acute Distress] : no acute distress [Normal Conjunctiva] : the conjunctiva exhibited no abnormalities [Normal Jugular Venous V Waves Present] : normal jugular venous V waves present [] : no respiratory distress [Respiration, Rhythm And Depth] : normal respiratory rhythm and effort [Exaggerated Use Of Accessory Muscles For Inspiration] : no accessory muscle use [Auscultation Breath Sounds / Voice Sounds] : lungs were clear to auscultation bilaterally [Heart Rate And Rhythm] : heart rate and rhythm were normal [Heart Sounds] : normal S1 and S2 [Murmurs] : no murmurs present [Arterial Pulses Normal] : the arterial pulses were normal [Edema] : no peripheral edema present [Bowel Sounds] : normal bowel sounds [Abdomen Soft] : soft [Abdomen Tenderness] : non-tender [Abnormal Walk] : normal gait [Nail Clubbing] : no clubbing of the fingernails [Cyanosis, Localized] : no localized cyanosis [Skin Color & Pigmentation] : normal skin color and pigmentation [Oriented To Time, Place, And Person] : oriented to person, place, and time [Affect] : the affect was normal [FreeTextEntry1] : NC/AT, PERRL

## 2022-11-21 NOTE — ASSESSMENT
[FreeTextEntry1] : CAD, s/p PCI of LAD with DESIREE. Now s/p PCI of OM\par Had a sleep test - diagnosed with GEO. Using CPAP.\par Head CT and f/u head MRI noted, results from ER here as well as [facility] reviewed.\par \par CT chest noted. Follow-up with pulmonary, ENT.\par Gained weight. Type II DM - A1c improved with therapy\par Lipid control discussed. C/w statin, increase Omega 3.\par Needs weight loss. Options discussed.\par Schedule for stress echo to assess for ischemia\par Quit smoking over 2 years - encouraged to stay off tobacco products.\par Secondary prevention discussed.\par \par \par \par F/u in 6 months, or if any symptoms.

## 2022-12-03 ENCOUNTER — APPOINTMENT (OUTPATIENT)
Dept: SLEEP CENTER | Facility: HOSPITAL | Age: 52
End: 2022-12-03

## 2022-12-03 ENCOUNTER — OUTPATIENT (OUTPATIENT)
Dept: OUTPATIENT SERVICES | Facility: HOSPITAL | Age: 52
LOS: 1 days | Discharge: HOME | End: 2022-12-03

## 2022-12-03 DIAGNOSIS — Z95.5 PRESENCE OF CORONARY ANGIOPLASTY IMPLANT AND GRAFT: Chronic | ICD-10-CM

## 2022-12-03 DIAGNOSIS — Z90.49 ACQUIRED ABSENCE OF OTHER SPECIFIED PARTS OF DIGESTIVE TRACT: Chronic | ICD-10-CM

## 2022-12-03 PROCEDURE — 95811 POLYSOM 6/>YRS CPAP 4/> PARM: CPT | Mod: 26

## 2022-12-05 DIAGNOSIS — G47.33 OBSTRUCTIVE SLEEP APNEA (ADULT) (PEDIATRIC): ICD-10-CM

## 2022-12-28 ENCOUNTER — APPOINTMENT (OUTPATIENT)
Age: 52
End: 2022-12-28
Payer: COMMERCIAL

## 2022-12-28 VITALS
BODY MASS INDEX: 41.18 KG/M2 | HEART RATE: 78 BPM | RESPIRATION RATE: 14 BRPM | DIASTOLIC BLOOD PRESSURE: 90 MMHG | SYSTOLIC BLOOD PRESSURE: 140 MMHG | HEIGHT: 69 IN | WEIGHT: 278 LBS | OXYGEN SATURATION: 97 %

## 2022-12-28 PROCEDURE — 99214 OFFICE O/P EST MOD 30 MIN: CPT

## 2022-12-28 NOTE — ASSESSMENT
[FreeTextEntry1] : HO GEO On CPAP 12 cm h2O\par Repeat study shows optimum CPAP 12 cm H2O \par HO small lung nodules.  recent CT with granuloma \par HO 30 PY smoking quit 2 years ago

## 2023-01-12 ENCOUNTER — APPOINTMENT (OUTPATIENT)
Dept: ORTHOPEDIC SURGERY | Facility: CLINIC | Age: 53
End: 2023-01-12
Payer: COMMERCIAL

## 2023-01-12 VITALS — BODY MASS INDEX: 41.47 KG/M2 | HEIGHT: 69 IN | WEIGHT: 280 LBS

## 2023-01-12 PROCEDURE — 99203 OFFICE O/P NEW LOW 30 MIN: CPT

## 2023-01-12 PROCEDURE — 73562 X-RAY EXAM OF KNEE 3: CPT | Mod: 50

## 2023-01-12 NOTE — DISCUSSION/SUMMARY
[de-identified] : Discussed x-rays with patient showing osteoarthritis bilateral knees.  Patient's pain is consistent with flareup of osteoarthritis.  Discussed treatment options in detail including rest, Tylenol, range of motion exercise, physical therapy, ice/heat, cortisone injection.  Patient states he is a diabetic and is unaware of his status.  Discussed with patient that he would benefit from cortisone injection cannot receive 1 today due to unknown A1c/glucose levels.  Advised patient to follow-up with primary physician for further evaluation of diabetes.  Advised patient to call office if symptoms worsen/change.  Follow-up in 6 weeks for reevaluation.  Patient understands agrees with plan.  Seen under supervision of Dr. Mcconnell.

## 2023-01-12 NOTE — PHYSICAL EXAM
[Right] : right knee [Equivocal] : equivocal Anyi [Left] : left knee [5___] : hamstring 5[unfilled]/5 [Negative] : negative anterior draw [] : no extensor lag [TWNoteComboBox7] : flexion 130 degrees [de-identified] : extension 0 degrees

## 2023-01-12 NOTE — DATA REVIEWED
[FreeTextEntry1] : X-ray right knee: No acute fractures, subluxation, dislocations.  Moderate osteoarthritis medial joint line narrowing. [FreeTextEntry2] : X-ray left knee: No acute fractures, subluxations, dislocations.  Mild to moderate osteoarthritis medial joint line narrowing

## 2023-01-12 NOTE — HISTORY OF PRESENT ILLNESS
[de-identified] : Patient is a 52-year-old male here for evaluation of bilateral knees.  Patient states that he has a long history of bilateral knee pain over the past few months with no injury or trauma.  Patient states over the past 7 to 10 days his pain has worsened without injury/trauma.  Patient states that he has been getting more difficult to walk, going up and down stairs, standing long periods of time.  Patient states that he does feel some instability bilateral knees.  Patient denies fevers/chills, denies numbness/tingling.  Patient states that the right knee is worse than the left knee.

## 2023-01-15 NOTE — H&P ADULT - ATTENDING COMMENTS
No I saw and evaluated the patient. I have reviewed and agree with the findings and plan of care as documented above in the resident’s note (unless indicated differently below). Any necessary changes were made in the body of the text.    48 yo M pt w/ a relevant hx of CAD (stented in 2014), GEO (on CPAP) and hypersensitivity pneumonitis (works as a  - follows w/ Pulmonologist). Coming in with chest pain. Onset: on the day before presentation in the evening. Quality: pressure. Location: midsternal. Radiation: left upper extremity. Intensity: initially milder but then worsened to 8/10 in intensity. Alleviated by rest. Exacerbated by exertion. Associated w/ dyspnea. No fevers, chills, coughing, diaphoresis, nausea, vomiting. +LE swelling (left > right) - no calf tenderness.     ROS:  Constitutional: no fevers; no chills  Eyes: no conjunctivitis; no itching  ENT: no dysphagia; no odynophagia  CVS: no PND; no orthopnea; +chest pain; +LE swelling  Resp: +SOB w/ chest pain and exertion; no coughing  GI: no nausea; no vomiting; no diarrhea; no abd pain  : no dysuria; no hematuria  MSK: no myalgias; no arthralgias  Skin: no rashes; no ulcers  Neuro: no focal weakness; no headache  All other systems reviewed and are negative    PMHx, home medications, SurHx, FHx and Social history as above in the corresponding sections of the note - reviewed and edited where appropriate    Exam:  Vitals: BP = 101/50; P = 74; T = 98.6; RR = 16; SpO2 > 95 on room air  General: appears stated age; cooperative  Eyes: anicteric sclera; moist conjunctiva w/ no lid lag; PERRL; EOMI  HENT: NC/AT; clear oropharynx w/ moist mucous membranes; nL hard/soft palate  Neck: supple w/ FROM; trachea midline; no thyromegaly; no carotid bruits  Lungs: cta b/l with no tachypnea, accessory muscle usage, wheezing, rhonci or rales  CVS: RRR; S1 and S2 w/o MRGs  Abd: BS+; soft; non-tender to palpation x 4; no masses or HSM  Ext: mild peripheral edema (left > right); pulses 2+ b/l  Skin: normal temp, turgor and texture; no rashes, ulcers or nodules  Neuro: CN II-XII intact; str 5/5 throughout; sensation grossly intact – light touch/temp  Psych: appropriate affect; alert and oriented to person, place, time and situation    Labs alk phos 139, AST/ALT 65/67; trops 0.09  CXR: unremarkable  EKG: NSR    Assessment:  NSTEMI  CAD  GEO  Hypersensitivity pneumonitis    Plan:  DAPT, statin, beta blocker, heparin  Cath in AM  Meds as dosed  Supportive care    Full code

## 2023-02-24 ENCOUNTER — APPOINTMENT (OUTPATIENT)
Dept: ORTHOPEDIC SURGERY | Facility: CLINIC | Age: 53
End: 2023-02-24

## 2023-03-01 ENCOUNTER — OUTPATIENT (OUTPATIENT)
Dept: OUTPATIENT SERVICES | Facility: HOSPITAL | Age: 53
LOS: 1 days | End: 2023-03-01
Payer: COMMERCIAL

## 2023-03-01 DIAGNOSIS — Z95.5 PRESENCE OF CORONARY ANGIOPLASTY IMPLANT AND GRAFT: Chronic | ICD-10-CM

## 2023-03-01 DIAGNOSIS — R06.02 SHORTNESS OF BREATH: ICD-10-CM

## 2023-03-01 DIAGNOSIS — Z90.49 ACQUIRED ABSENCE OF OTHER SPECIFIED PARTS OF DIGESTIVE TRACT: Chronic | ICD-10-CM

## 2023-03-01 PROCEDURE — 94060 EVALUATION OF WHEEZING: CPT | Mod: 26

## 2023-03-01 PROCEDURE — 94070 EVALUATION OF WHEEZING: CPT

## 2023-03-01 PROCEDURE — 94726 PLETHYSMOGRAPHY LUNG VOLUMES: CPT

## 2023-03-01 PROCEDURE — 94729 DIFFUSING CAPACITY: CPT

## 2023-03-01 PROCEDURE — 94727 GAS DIL/WSHOT DETER LNG VOL: CPT | Mod: 26

## 2023-03-01 PROCEDURE — 94729 DIFFUSING CAPACITY: CPT | Mod: 26

## 2023-03-02 DIAGNOSIS — R06.02 SHORTNESS OF BREATH: ICD-10-CM

## 2023-03-08 ENCOUNTER — APPOINTMENT (OUTPATIENT)
Dept: NUTRITION | Facility: CLINIC | Age: 53
End: 2023-03-08

## 2023-03-08 ENCOUNTER — OUTPATIENT (OUTPATIENT)
Dept: OUTPATIENT SERVICES | Facility: HOSPITAL | Age: 53
LOS: 1 days | End: 2023-03-08
Payer: COMMERCIAL

## 2023-03-08 ENCOUNTER — NON-APPOINTMENT (OUTPATIENT)
Age: 53
End: 2023-03-08

## 2023-03-08 ENCOUNTER — APPOINTMENT (OUTPATIENT)
Dept: ENDOCRINOLOGY | Facility: CLINIC | Age: 53
End: 2023-03-08

## 2023-03-08 VITALS
DIASTOLIC BLOOD PRESSURE: 89 MMHG | HEART RATE: 64 BPM | BODY MASS INDEX: 41.62 KG/M2 | WEIGHT: 281 LBS | HEIGHT: 69 IN | SYSTOLIC BLOOD PRESSURE: 163 MMHG

## 2023-03-08 DIAGNOSIS — Z95.5 PRESENCE OF CORONARY ANGIOPLASTY IMPLANT AND GRAFT: Chronic | ICD-10-CM

## 2023-03-08 DIAGNOSIS — Z00.00 ENCOUNTER FOR GENERAL ADULT MEDICAL EXAMINATION WITHOUT ABNORMAL FINDINGS: ICD-10-CM

## 2023-03-08 DIAGNOSIS — Z90.49 ACQUIRED ABSENCE OF OTHER SPECIFIED PARTS OF DIGESTIVE TRACT: Chronic | ICD-10-CM

## 2023-03-08 PROCEDURE — 99204 OFFICE O/P NEW MOD 45 MIN: CPT

## 2023-03-08 PROCEDURE — G0108: CPT

## 2023-03-08 NOTE — HISTORY OF PRESENT ILLNESS
[FreeTextEntry1] : Pt is a 51 y/o M with a PMHx of DM, HTN, HLD, and CAD (2 past MI 10yrs and 3 yrs ago). Pt follows with Dr Caballero, and PCP is Dr Sam. \par \par Pt presents to endocrine clinic to establish care.

## 2023-03-08 NOTE — REVIEW OF SYSTEMS
[Polyuria] : polyuria [Joint Pain] : joint pain [Back Pain] : back pain [Fatigue] : no fatigue [Dry Eyes] : no dryness [Neck Pain] : no neck pain [Chest Pain] : no chest pain [Palpitations] : no palpitations [Shortness Of Breath] : no shortness of breath [Cough] : no cough [Nausea] : no nausea [Vomiting] : no vomiting [Incontinence] : no incontinence [Headaches] : no headaches [FreeTextEntry9] : b/l knee pain

## 2023-03-08 NOTE — ASSESSMENT
[Carbohydrate Consistent Diet] : carbohydrate consistent diet [Diabetes Foot Care] : diabetes foot care [Long Term Vascular Complications] : long term vascular complications of diabetes [Importance of Diet and Exercise] : importance of diet and exercise to improve glycemic control, achieve weight loss and improve cardiovascular health [Retinopathy Screening] : Patient was referred to ophthalmology for retinopathy screening [FreeTextEntry1] : Pt is a 53 yo M with a PMHx of DM, HTN, HLD, CAD w// h/o 2 MI in the past, presents for endocrine clinic to establish care and for diabetes counseling\par \par #DM\par - A1C 6.5 02/2023\par - Takes Metformin 750mg BID and Jardiance 25mg QD daily\par - \par \par #HTN\par - BP elevated 163/89 in office\par - Pt reports he took his meds and is complaint with them\par \par #HLD\par - 02/2023 T Chol 102, , HDL 27, VLDL 34, LDL 41\par patient's wife is refusing all extra diabetes medications. no need for endocrine follow up.\par \par \par \par \par \par \par

## 2023-03-08 NOTE — PHYSICAL EXAM
[Alert] : alert [Well Nourished] : well nourished [No Acute Distress] : no acute distress [Normal Sclera/Conjunctiva] : normal sclera/conjunctiva [Normal Outer Ear/Nose] : the ears and nose were normal in appearance [No Neck Mass] : no neck mass was observed [No Respiratory Distress] : no respiratory distress [No Accessory Muscle Use] : no accessory muscle use [Clear to Auscultation] : lungs were clear to auscultation bilaterally [Normal S1, S2] : normal S1 and S2 [Normal Rate] : heart rate was normal [Normal Bowel Sounds] : normal bowel sounds [Soft] : abdomen soft [Normal Gait] : normal gait [No Joint Swelling] : no joint swelling seen [Oriented x3] : oriented to person, place, and time [Normal Affect] : the affect was normal [Normal Mood] : the mood was normal

## 2023-03-09 DIAGNOSIS — E11.9 TYPE 2 DIABETES MELLITUS WITHOUT COMPLICATIONS: ICD-10-CM

## 2023-03-09 DIAGNOSIS — Z00.00 ENCOUNTER FOR GENERAL ADULT MEDICAL EXAMINATION WITHOUT ABNORMAL FINDINGS: ICD-10-CM

## 2023-03-16 ENCOUNTER — APPOINTMENT (OUTPATIENT)
Dept: ORTHOPEDIC SURGERY | Facility: CLINIC | Age: 53
End: 2023-03-16
Payer: COMMERCIAL

## 2023-03-16 DIAGNOSIS — M17.0 BILATERAL PRIMARY OSTEOARTHRITIS OF KNEE: ICD-10-CM

## 2023-03-16 PROCEDURE — 99213 OFFICE O/P EST LOW 20 MIN: CPT

## 2023-03-16 NOTE — HISTORY OF PRESENT ILLNESS
[de-identified] :  52-year-old gentleman referred to my office for management of bilateral medial-sided knee pain.   for better than 20 years he has had 6 months of increasing pain.  He takes supplemental oral glucosamine and chondroitin without significant relief of symptoms per.  Recently changed formulation is to 1 that includes Allis cast which improved his symptoms.  As the general he avoids medications because he is on medicine for diabetes harder and blood pressure.  Non-insulin-dependent diabetic his hemoglobin A1c was last 6.4.  He does not walk with any assistive devices such as braces canes etc and has been able to continue work as a .  He has pain with prolonged standing and even in the evenings.

## 2023-03-16 NOTE — IMAGING
[de-identified] :   Pleasant early middle-aged gentleman sits reasonably comfortably my office no distress.  He is accompanied by his wife.\par \par Physical examination:\par Bilateral knees:  Medial joint line tenderness.  Mild synovial thickening.  Slight varus alignment to the knees.  He has calluses over the anterior aspect of the way clearly kneels for work but has no tenderness over the patellar tendon patella or quadriceps tendon.  Negative patellofemoral grind test.  Negative apprehension test.  No lateral joint line tenderness.  No varus valgus instability but an abnormal Apley's and Yuri's localized medial aspect of his knee.  Negative Lachman test.  Unrestricted knee motion 0-130 degrees.  Calf is soft no cords.  No geniculate lymph nodes or masses.\par \par Radiographs bilateral weight-bearing knees from January 12, 2023 reviewed.  These demonstrate mild medial joint space narrowing.  No marginal osteophytes.  No subchondral sclerotic changes.  No cystic changes.

## 2023-03-16 NOTE — ASSESSMENT
[FreeTextEntry1] :   52-year-old gentleman with mild bilateral knee arthritis and probably a degenerative medial meniscal tear.  My recommendation is to manage this conservatively.We had a long discussion about treatment options.  We talked about weight loss and strategies a weight loss.  We talked about the importance of lifestyle changes versus diets and I recommended a nutritionist.  We also talked about nonsteroidal anti-inflammatory medication especially in light of his known history of hypertension and heart disease.  Since he is on a baby aspirin today I do not think this could be a problem with him taking and glucose me and chondroitin sulfate formula shin and also the little bit of salicylic acid (e.g. aspirin) in it.  In addition we can try a topical nonsteroidal anti-inflammatory medications such as diclofenac.  We also talked about the importance of physical therapy.  The more fishing Sujata his knees move the less likely he will have symptoms.  Physical therapy is different from work.  He agreed the physical therapy prior be helpful and I would like him to get physical therapy terminal extension quadriceps and VMO strengthening gait training balance and generalized conditioning.  The focus of physical therapy should be on teaching him a self-directed exercise program.  Modalities could be utilized adjunct therapy.  Lastly we also talked about hyaluronic acid injections.  I would like him to try non invasive treatment such as NSAIDs physical therapy weight loss before we could injections.  I do not think we in light of his history of diabetes that steroids is a good choice for him.  All questions were answered to his and his wife's satisfaction.  We will see him back in the office in 3-6 months time to see how he is getting along.

## 2023-03-17 DIAGNOSIS — E66.9 OBESITY, UNSPECIFIED: ICD-10-CM

## 2023-03-17 DIAGNOSIS — E11.65 TYPE 2 DIABETES MELLITUS WITH HYPERGLYCEMIA: ICD-10-CM

## 2023-03-21 RX ORDER — DICLOFENAC SODIUM 20 MG/G
2 SOLUTION TOPICAL 3 TIMES DAILY
Qty: 1 | Refills: 0 | Status: ACTIVE | COMMUNITY
Start: 2023-03-21 | End: 1900-01-01

## 2023-04-20 ENCOUNTER — APPOINTMENT (OUTPATIENT)
Dept: NEUROLOGY | Facility: CLINIC | Age: 53
End: 2023-04-20
Payer: COMMERCIAL

## 2023-04-20 VITALS — BODY MASS INDEX: 41.32 KG/M2 | HEIGHT: 69 IN | WEIGHT: 279 LBS

## 2023-04-20 PROCEDURE — 99203 OFFICE O/P NEW LOW 30 MIN: CPT

## 2023-04-20 NOTE — HISTORY OF PRESENT ILLNESS
[FreeTextEntry1] : It's a pleasure to see Mr. JAMSHID PHIPPS In the office today. He is a 52 year -  old man  who presents to the office today  only because he had an abnormal CT of the head which was done after a injury that he sustained and work where he had trauma to the face.  He did not lose consciousness but when he was taken to the hospital because he was on a blood thinner he was given a CT scan of the head which showed possible cerebellar stroke.  He did subsequently go for MRI of the brain which did not show a stroke.  His nurse practitioner told him to come to the neurologist just to get theI assurance.  He does not have any complaints today.\par \par  he has sleep apnea and is on CPAP therapy.  He is a long-term smoker and has been getting yearly CT scan of the lung for prevention.

## 2023-04-20 NOTE — ASSESSMENT
[FreeTextEntry1] : Imaging Review. \par -  I assured him that there is no evidence of a stroke on the MRI of the brain, therefore he could nor the abnormality that was seen on the CT scan of the head.  \par - I advised him that he speaks with his pulmonologist regarding getting that yearly lung CT because of radiation concerns\par \par \par \par \par \par \par \par I, Renée Chandler, Attest that this documentation has been prepared under the direction and in the presence of Provider Ranjeet Edward DO\par \par Thank You for letting me assist in the management of this patient. \par \par Ranjeet Edward DO\[employer] Board Certified, Neurology\par

## 2023-04-24 ENCOUNTER — APPOINTMENT (OUTPATIENT)
Dept: NUTRITION | Facility: CLINIC | Age: 53
End: 2023-04-24

## 2023-04-27 NOTE — ED ADULT NURSE NOTE - NSICDXFAMILYHX_GEN_ALL_CORE_FT
FAMILY HISTORY:  Family history of cardiovascular disease  FH: stroke     Hydroxychloroquine Pregnancy And Lactation Text: This medication has been shown to cause fetal harm but it isn't assigned a Pregnancy Risk Category. There are small amounts excreted in breast milk.

## 2023-05-01 ENCOUNTER — APPOINTMENT (OUTPATIENT)
Dept: CARDIOLOGY | Facility: CLINIC | Age: 53
End: 2023-05-01
Payer: COMMERCIAL

## 2023-05-01 ENCOUNTER — RESULT CHARGE (OUTPATIENT)
Age: 53
End: 2023-05-01

## 2023-05-01 VITALS
WEIGHT: 272 LBS | BODY MASS INDEX: 40.29 KG/M2 | HEART RATE: 103 BPM | DIASTOLIC BLOOD PRESSURE: 86 MMHG | SYSTOLIC BLOOD PRESSURE: 150 MMHG | HEIGHT: 69 IN

## 2023-05-01 PROCEDURE — 99214 OFFICE O/P EST MOD 30 MIN: CPT | Mod: 25

## 2023-05-01 PROCEDURE — 93000 ELECTROCARDIOGRAM COMPLETE: CPT

## 2023-05-01 RX ORDER — LOSARTAN POTASSIUM 100 MG/1
100 TABLET, FILM COATED ORAL DAILY
Qty: 1 | Refills: 3 | Status: ACTIVE | COMMUNITY
Start: 2021-06-14 | End: 1900-01-01

## 2023-05-01 NOTE — HISTORY OF PRESENT ILLNESS
[FreeTextEntry1] : 51 y/o male with history of HTN, DL, CAD, s/p PCI of LAD in February of 2014,  S/p NSTEMI, and is s/p PCI of OM with DESIREE by Dr. Brink. Since the last visit, the patient reports no chest pain, mild dyspnea, no orthopnea or PND. Unable to loose weight.  He quit smoking after the MI and has been tobacco x 2 years   Was seen by pulmonary, was diagnosed with GEO, started on CPAP. Also has interstitial lung disease on CT chest, being w/u for interstitial pneumonitis. Followed  with pulmonary has been stable . Pt has  stent in LAD. Distal LAD moderate lesion. OM - severe lesion, successfully stented with DESIREE.  New dx DM Hgb A1C 10.2 started on meds,\par 3/9/23 HgbA1C  improved to 6.5  Chol 138 improved from 165, TRIG 212  improved from  844 LDL 41 improved from 66.   New pain in B Knees. \par 08/24/22 Exercise Stress Echo EF 61% no ischemia \par S/p ER visit for head trauma  CT of the head was done +  Medial right cerebellar hypodensity possibly representing \par age-indeterminate infarct versus retrocerebellar cyst. MRI of the brain was done and was negative.

## 2023-05-01 NOTE — ASSESSMENT
[FreeTextEntry1] : CAD, s/p PCI of LAD with DESIREE. Now s/p PCI of OM\par Had a sleep test - diagnosed with GEO. Using CPAP.\par Head CT and f/u head MRI noted, results from ER here as well as [facility] reviewed.\par \par CT chest noted. Follow-up with pulmonary, ENT.\par Gained weight. Type II DM - A1c improved with therapy\par Lipid control discussed. C/w statin, increase Omega 3.\par Needs weight loss. Options discussed.\par Increase losartan to 100 mg.\par \par Schedule for stress echo to assess for ischemia in September.\par Quit smoking over 2 years - encouraged to stay off tobacco products.\par Secondary prevention discussed.\par \par \par \par F/u in 6 months, or if any symptoms.

## 2023-05-04 ENCOUNTER — APPOINTMENT (OUTPATIENT)
Dept: ORTHOPEDIC SURGERY | Facility: CLINIC | Age: 53
End: 2023-05-04

## 2023-05-26 ENCOUNTER — APPOINTMENT (OUTPATIENT)
Dept: ORTHOPEDIC SURGERY | Facility: CLINIC | Age: 53
End: 2023-05-26
Payer: COMMERCIAL

## 2023-05-26 DIAGNOSIS — S83.232D COMPLEX TEAR OF MEDIAL MENISCUS, CURRENT INJURY, LEFT KNEE, SUBSEQUENT ENCOUNTER: ICD-10-CM

## 2023-05-26 DIAGNOSIS — M25.562 PAIN IN LEFT KNEE: ICD-10-CM

## 2023-05-26 PROCEDURE — 73562 X-RAY EXAM OF KNEE 3: CPT | Mod: LT

## 2023-05-26 PROCEDURE — 99213 OFFICE O/P EST LOW 20 MIN: CPT

## 2023-05-26 NOTE — IMAGING
[de-identified] : Physical exam of the left knee: Mild effusion.  No erythema or ecchymosis.  Tenderness to palpation over the medial joint line.  No lateral joint line tenderness to palpation.  No tenderness palpation of the anterior aspect of the left knee.  Full extension, flexion to 110 degrees.  He has pain with flexion.  Positive Yuri's medially.  Intact to light touch and sensation, mildl antalgic gait.

## 2023-05-26 NOTE — DATA REVIEWED
[Left] : left [Knee] : knee [FreeTextEntry1] : 3 views of the left knee were obtained here in the office today and show: Well-preserved medial lateral compartments.  No fractures noted.  No soft tissue calcifications or bone masses.

## 2023-05-26 NOTE — HISTORY OF PRESENT ILLNESS
[de-identified] : The patient is a 52-year-old male, accompanied by his spouse, here for an evaluation of his left knee.  Earlier today while walking down the stairs his left knee buckled.  He started to fall but he caught himself.  He developed swelling in his knee.  He points medially as to where the pain is.  He is a diabetic, his last A1c was around 6.5 he is on blood thinners so he cannot take oral anti-inflammatory medications.

## 2023-05-26 NOTE — DISCUSSION/SUMMARY
[de-identified] : I reviewed the x-ray findings with the patient and his spouse.  At this point I recommend MRI of the left knee to evaluate for medial meniscus tear.  He had an episode of buckling earlier today, he is reporting clicking and locking.  On exam he has an effusion, medial joint line tenderness to palpation, positive Yuri's with x-rays that show well-preserved medial and lateral compartments, Kellgren-Jose Alberto classification 1.  He cannot take oral anti-inflammatory medications because he is on blood thinners.  He will use Tylenol arthritis for pain.  He will call me 2 days after the MRI is performed so we can discuss the results, he has a follow-up appointment on 6/15/2023 with Dr. Washington will keep that appointment.

## 2023-05-31 ENCOUNTER — APPOINTMENT (OUTPATIENT)
Dept: MRI IMAGING | Facility: CLINIC | Age: 53
End: 2023-05-31
Payer: COMMERCIAL

## 2023-05-31 PROCEDURE — 73721 MRI JNT OF LWR EXTRE W/O DYE: CPT | Mod: LT

## 2023-06-06 ENCOUNTER — APPOINTMENT (OUTPATIENT)
Dept: ORTHOPEDIC SURGERY | Facility: CLINIC | Age: 53
End: 2023-06-06
Payer: COMMERCIAL

## 2023-06-06 DIAGNOSIS — S86.912A STRAIN OF UNSPECIFIED MUSCLE(S) AND TENDON(S) AT LOWER LEG LEVEL, LEFT LEG, INITIAL ENCOUNTER: ICD-10-CM

## 2023-06-06 DIAGNOSIS — M25.561 PAIN IN RIGHT KNEE: ICD-10-CM

## 2023-06-06 DIAGNOSIS — M70.41 PREPATELLAR BURSITIS, RIGHT KNEE: ICD-10-CM

## 2023-06-06 DIAGNOSIS — M70.42 PREPATELLAR BURSITIS, RIGHT KNEE: ICD-10-CM

## 2023-06-06 PROCEDURE — 99213 OFFICE O/P EST LOW 20 MIN: CPT

## 2023-06-06 NOTE — HISTORY OF PRESENT ILLNESS
[de-identified] :  52-year-old gentleman last seen in my office March 16, 2023 with a working diagnosis of mild medial knee arthritis and degenerative meniscal tear.  Subsequently seen May 26th 2023 by my assistant at [facility].  The patient reports that on that day he fell down a flight of stairs injuring his knee.  MRI was arranged the results which are available today.  He he notes that he is walking with a cane and a Velcro it knee sleeve.  He finds that the compression is helpful makes his knee feel more stable.  He is not routinely taking any pain medication.  He is accompanied by his wife in the exam today.  He has questions about the MRI findings.

## 2023-06-06 NOTE — ASSESSMENT
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  Recently had a fall irritated some mild underlying arthritis.  MRI of his left knee showed a small degenerative tear of the medial meniscus and MCL strain.  Although the MRI shows the sighting most of his just an exacerbation of his underlying overuse.  He he continued to have similar symptoms on the right knee.  There are not to the same degree.  Does also have symptoms of catching and giving way in the right knee.  For that I think it is prudent to get an MRI of the right knee to rule significant meniscal tear.  Barring him major meniscal tear we had a long conversation about physical therapy activity modification modifying her work environment etc. if I can call over the MRI with him have my assistant do so.  Will give him prescription for physical therapy today.  All questions were answered to his and his wife's satisfaction.  \par \par Plan:  1. MRI right knee.  \par \par 2. Physical therapy of both knees focusing on terminal extension quadriceps and VMO strengthening gait training balance and generalized conditioning.  Physical therapy should also work on quadriceps and hamstring stretching.  The focus of physical therapy should be teaching him a self-directed exercise program of stretching and strengthening as well as strategies to minimize wear and tear on his knees.  He should avoid deep flexion strengthening exercises such as lunges and squats.\par \par 3. Pain management with Tylenol over the topical NSAIDs

## 2023-06-06 NOTE — IMAGING
[de-identified] :   Daya middle-aged gentleman sits comfortably my office no distress.  \par \par \par Physical examination: \par Left knee:  He has tenderness palpation over the prepatellar bursa and patellar tendon.  He is able leg it extend his knee against resistance.  He has medial joint line tenderness with a mildly abnormal Apley's and Yuri's test left knee.  He has tenderness along the prepatellar bursa.  \par \par Right knee: Similar examination with less tenderness over the prepatellar bursa.\par \par MRI reviewed.  Small degenerative medial meniscal tear.  Probably small mild MCL strain.  Mild arthritic changes.

## 2023-06-13 ENCOUNTER — APPOINTMENT (OUTPATIENT)
Dept: MRI IMAGING | Facility: CLINIC | Age: 53
End: 2023-06-13
Payer: COMMERCIAL

## 2023-06-13 PROCEDURE — 73721 MRI JNT OF LWR EXTRE W/O DYE: CPT | Mod: RT

## 2023-06-15 ENCOUNTER — APPOINTMENT (OUTPATIENT)
Dept: ORTHOPEDIC SURGERY | Facility: CLINIC | Age: 53
End: 2023-06-15

## 2023-06-30 NOTE — ED ADULT NURSE NOTE - HIV OFFER
[facility]    Medication Changes:  Decrease losartan to 50 mg daily      Instructions:  Check blood pressure and pulse daily, 1-2 hours after morning medications for the next 1-2 weeks.  Call if consistently > 140/90.      Return to clinic:   6 months  Sooner if needed      Our clinic phone number is [phone].     Previously Declined (within the last year)

## 2023-07-31 ENCOUNTER — APPOINTMENT (OUTPATIENT)
Dept: PULMONOLOGY | Facility: CLINIC | Age: 53
End: 2023-07-31

## 2023-08-03 ENCOUNTER — APPOINTMENT (OUTPATIENT)
Dept: ORTHOPEDIC SURGERY | Facility: CLINIC | Age: 53
End: 2023-08-03

## 2023-09-01 ENCOUNTER — APPOINTMENT (OUTPATIENT)
Dept: CARDIOLOGY | Facility: CLINIC | Age: 53
End: 2023-09-01
Payer: COMMERCIAL

## 2023-09-01 PROCEDURE — 93325 DOPPLER ECHO COLOR FLOW MAPG: CPT

## 2023-09-01 PROCEDURE — 93320 DOPPLER ECHO COMPLETE: CPT

## 2023-09-01 PROCEDURE — 93351 STRESS TTE COMPLETE: CPT

## 2023-09-18 ENCOUNTER — APPOINTMENT (OUTPATIENT)
Dept: CARDIOLOGY | Facility: CLINIC | Age: 53
End: 2023-09-18
Payer: COMMERCIAL

## 2023-09-18 VITALS
SYSTOLIC BLOOD PRESSURE: 136 MMHG | DIASTOLIC BLOOD PRESSURE: 74 MMHG | BODY MASS INDEX: 40.58 KG/M2 | HEART RATE: 56 BPM | HEIGHT: 69 IN | WEIGHT: 274 LBS

## 2023-09-18 DIAGNOSIS — R93.89 ABNORMAL FINDINGS ON DIAGNOSTIC IMAGING OF OTHER SPECIFIED BODY STRUCTURES: ICD-10-CM

## 2023-09-18 DIAGNOSIS — E11.9 TYPE 2 DIABETES MELLITUS W/OUT COMPLICATIONS: ICD-10-CM

## 2023-09-18 PROCEDURE — 99214 OFFICE O/P EST MOD 30 MIN: CPT | Mod: 25

## 2023-09-18 PROCEDURE — 93000 ELECTROCARDIOGRAM COMPLETE: CPT

## 2023-09-18 RX ORDER — DICLOFENAC SODIUM 3 G/100G
3 GEL TOPICAL TWICE DAILY
Qty: 1 | Refills: 1 | Status: DISCONTINUED | COMMUNITY
Start: 2023-03-16 | End: 2023-09-18

## 2023-09-18 RX ORDER — METOPROLOL SUCCINATE 50 MG/1
50 TABLET, EXTENDED RELEASE ORAL
Refills: 0 | Status: DISCONTINUED | COMMUNITY
End: 2023-09-18

## 2023-09-26 NOTE — ED ADULT TRIAGE NOTE - HEIGHT IN INCHES
9 Hemigard Intro: Due to skin fragility and wound tension, it was decided to use HEMIGARD adhesive retention suture devices to permit a linear closure. The skin was cleaned and dried for a 6cm distance away from the wound. Excessive hair, if present, was removed to allow for adhesion.

## 2023-10-06 ENCOUNTER — TRANSCRIPTION ENCOUNTER (OUTPATIENT)
Age: 53
End: 2023-10-06

## 2023-11-16 ENCOUNTER — APPOINTMENT (OUTPATIENT)
Dept: PULMONOLOGY | Facility: CLINIC | Age: 53
End: 2023-11-16

## 2023-11-20 ENCOUNTER — APPOINTMENT (OUTPATIENT)
Dept: PULMONOLOGY | Facility: CLINIC | Age: 53
End: 2023-11-20
Payer: COMMERCIAL

## 2023-11-20 VITALS
RESPIRATION RATE: 14 BRPM | WEIGHT: 279 LBS | HEIGHT: 69 IN | DIASTOLIC BLOOD PRESSURE: 78 MMHG | HEART RATE: 62 BPM | SYSTOLIC BLOOD PRESSURE: 132 MMHG | OXYGEN SATURATION: 97 % | BODY MASS INDEX: 41.32 KG/M2

## 2023-11-20 DIAGNOSIS — G47.33 OBSTRUCTIVE SLEEP APNEA (ADULT) (PEDIATRIC): ICD-10-CM

## 2023-11-20 DIAGNOSIS — R91.1 SOLITARY PULMONARY NODULE: ICD-10-CM

## 2023-11-20 PROCEDURE — 99214 OFFICE O/P EST MOD 30 MIN: CPT

## 2024-03-11 NOTE — PHYSICAL EXAM
PAST MEDICAL HISTORY:  Diabetes mellitus     Hypertension      [Person] : oriented to person [Place] : oriented to place [Time] : oriented to time [Concentration Intact] : normal concentrating ability [Visual Intact] : visual attention was ~T not ~L decreased [Naming Objects] : no difficulty naming common objects [Repeating Phrases] : no difficulty repeating a phrase [Writing A Sentence] : no difficulty writing a sentence [Fluency] : fluency intact [Comprehension] : comprehension intact [Reading] : reading intact [Past History] : adequate knowledge of personal past history [Cranial Nerves Optic (II)] : visual acuity intact bilaterally,  visual fields full to confrontation, pupils equal round and reactive to light [Cranial Nerves Oculomotor (III)] : extraocular motion intact [Cranial Nerves Trigeminal (V)] : facial sensation intact symmetrically [Cranial Nerves Facial (VII)] : face symmetrical [Cranial Nerves Vestibulocochlear (VIII)] : hearing was intact bilaterally [Cranial Nerves Glossopharyngeal (IX)] : tongue and palate midline [Cranial Nerves Accessory (XI - Cranial And Spinal)] : head turning and shoulder shrug symmetric [Cranial Nerves Hypoglossal (XII)] : there was no tongue deviation with protrusion [Motor Tone] : muscle tone was normal in all four extremities [Motor Strength] : muscle strength was normal in all four extremities [No Muscle Atrophy] : normal bulk in all four extremities [Sensation Tactile Decrease] : light touch was intact [Abnormal Walk] : normal gait [Balance] : balance was intact [Past-pointing] : there was no past-pointing [Tremor] : no tremor present [2+] : Ankle jerk left 2+ [Plantar Reflex Right Only] : normal on the right [Plantar Reflex Left Only] : normal on the left

## 2024-03-19 ENCOUNTER — APPOINTMENT (OUTPATIENT)
Dept: CARDIOLOGY | Facility: CLINIC | Age: 54
End: 2024-03-19
Payer: COMMERCIAL

## 2024-03-19 ENCOUNTER — RESULT CHARGE (OUTPATIENT)
Age: 54
End: 2024-03-19

## 2024-03-19 VITALS
BODY MASS INDEX: 40.73 KG/M2 | WEIGHT: 275 LBS | HEIGHT: 69 IN | SYSTOLIC BLOOD PRESSURE: 130 MMHG | DIASTOLIC BLOOD PRESSURE: 78 MMHG

## 2024-03-19 VITALS — HEART RATE: 67 BPM

## 2024-03-19 DIAGNOSIS — G47.33 OBSTRUCTIVE SLEEP APNEA (ADULT) (PEDIATRIC): ICD-10-CM

## 2024-03-19 DIAGNOSIS — E78.5 HYPERLIPIDEMIA, UNSPECIFIED: ICD-10-CM

## 2024-03-19 DIAGNOSIS — I25.10 ATHEROSCLEROTIC HEART DISEASE OF NATIVE CORONARY ARTERY W/OUT ANGINA PECTORIS: ICD-10-CM

## 2024-03-19 DIAGNOSIS — E66.9 OBESITY, UNSPECIFIED: ICD-10-CM

## 2024-03-19 DIAGNOSIS — Z95.5 PRESENCE OF CORONARY ANGIOPLASTY IMPLANT AND GRAFT: ICD-10-CM

## 2024-03-19 DIAGNOSIS — I10 ESSENTIAL (PRIMARY) HYPERTENSION: ICD-10-CM

## 2024-03-19 DIAGNOSIS — E11.65 TYPE 2 DIABETES MELLITUS WITH HYPERGLYCEMIA: ICD-10-CM

## 2024-03-19 PROCEDURE — 99214 OFFICE O/P EST MOD 30 MIN: CPT | Mod: 25

## 2024-03-19 PROCEDURE — 93000 ELECTROCARDIOGRAM COMPLETE: CPT

## 2024-03-19 RX ORDER — VALSARTAN 320 MG/1
320 TABLET, COATED ORAL DAILY
Refills: 0 | Status: ACTIVE | COMMUNITY

## 2024-03-19 RX ORDER — ORAL SEMAGLUTIDE 3 MG/1
3 TABLET ORAL
Refills: 0 | Status: ACTIVE | COMMUNITY

## 2024-03-19 NOTE — HISTORY OF PRESENT ILLNESS
[FreeTextEntry1] : 52 y/o male with history of HTN, DL, CAD, s/p PCI of LAD in February of 2014,  S/p NSTEMI, and is s/p PCI of OM with DESIREE by Dr. Brink. Since the last visit, the patient reports no chest pain, mild dyspnea, no orthopnea or PND. Unable to loose weight.  He quit smoking after the MI and has been tobacco free  x 2 years   Was seen by pulmonary, was diagnosed with GEO, started on CPAP. Also has interstitial lung disease on CT chest, being w/u for interstitial pneumonitis. Followed  with pulmonary has been stable . Pt has  stent in LAD. Distal LAD moderate lesion. OM - severe lesion, successfully stented with DESIREE.  S/p ER visit for head trauma. F/U  MRI of the brain was done and was negative. Reports elevated BP in the afternoon. Started Rybelsus for DM and weight loss.  09/14/23  HgbA1C  6.4  Chol 110 improved from 165, TRIG 249  improved from  844 LDL 43  improved from 66.   Chronic pain in B Knees.  09/01/23  Exercise Stress Echo EF 62% Achieved 10.3 METs, Target Heat Rate wasn't achieved . No ischemia at submaximal HR  Grade I diastolic dysfunction

## 2024-03-19 NOTE — PHYSICAL EXAM
[General Appearance - Well Developed] : well developed [General Appearance - Well Nourished] : well nourished [General Appearance - In No Acute Distress] : no acute distress [Normal Conjunctiva] : the conjunctiva exhibited no abnormalities [Normal Jugular Venous V Waves Present] : normal jugular venous V waves present [] : no respiratory distress [Exaggerated Use Of Accessory Muscles For Inspiration] : no accessory muscle use [Respiration, Rhythm And Depth] : normal respiratory rhythm and effort [Auscultation Breath Sounds / Voice Sounds] : lungs were clear to auscultation bilaterally [Heart Rate And Rhythm] : heart rate and rhythm were normal [Heart Sounds] : normal S1 and S2 [Murmurs] : no murmurs present [Arterial Pulses Normal] : the arterial pulses were normal [Edema] : no peripheral edema present [Bowel Sounds] : normal bowel sounds [Abdomen Soft] : soft [Abdomen Tenderness] : non-tender [Abnormal Walk] : normal gait [Nail Clubbing] : no clubbing of the fingernails [Cyanosis, Localized] : no localized cyanosis [Skin Color & Pigmentation] : normal skin color and pigmentation [Oriented To Time, Place, And Person] : oriented to person, place, and time [Affect] : the affect was normal [FreeTextEntry1] : NC/AT, PERRL

## 2024-03-19 NOTE — REVIEW OF SYSTEMS
[Negative] : Heme/Lymph [SOB] : no shortness of breath [Dyspnea on exertion] : not dyspnea during exertion [Chest Discomfort] : no chest discomfort [Leg Claudication] : no intermittent leg claudication [Lower Ext Edema] : no extremity edema [Palpitations] : no palpitations [PND] : no PND [Orthopnea] : no orthopnea [Syncope] : no syncope

## 2024-03-19 NOTE — ASSESSMENT
[FreeTextEntry1] : CAD, s/p PCI of LAD with DESIREE. Now s/p PCI of OM Had a sleep test - diagnosed with GEO. Using CPAP. Head CT and f/u head MRI noted, results from ER here as well as [facility] reviewed.  CT chest noted. Follow-up with pulmonary, ENT. Gained weight. Type II DM - A1c improved with therapy. C/w Rybelsus if covered. Lipid control discussed. C/w statin, increase Omega 3. Needs weight loss.  d/c losartan 100 mg and start valsartan 160 mg. If still elevated BP, increase to 320 mg  Quit smoking over 2 years - encouraged to stay off tobacco products. Secondary prevention discussed.    F/u in 6 months, or if any symptoms.

## 2024-09-30 ENCOUNTER — APPOINTMENT (OUTPATIENT)
Dept: CARDIOLOGY | Facility: CLINIC | Age: 54
End: 2024-09-30
Payer: COMMERCIAL

## 2024-09-30 ENCOUNTER — RESULT CHARGE (OUTPATIENT)
Age: 54
End: 2024-09-30

## 2024-09-30 VITALS
WEIGHT: 280 LBS | SYSTOLIC BLOOD PRESSURE: 138 MMHG | BODY MASS INDEX: 41.47 KG/M2 | HEIGHT: 69 IN | DIASTOLIC BLOOD PRESSURE: 82 MMHG | HEART RATE: 70 BPM

## 2024-09-30 DIAGNOSIS — E11.9 TYPE 2 DIABETES MELLITUS W/OUT COMPLICATIONS: ICD-10-CM

## 2024-09-30 DIAGNOSIS — I10 ESSENTIAL (PRIMARY) HYPERTENSION: ICD-10-CM

## 2024-09-30 DIAGNOSIS — E78.5 HYPERLIPIDEMIA, UNSPECIFIED: ICD-10-CM

## 2024-09-30 DIAGNOSIS — I25.10 ATHEROSCLEROTIC HEART DISEASE OF NATIVE CORONARY ARTERY W/OUT ANGINA PECTORIS: ICD-10-CM

## 2024-09-30 DIAGNOSIS — Z95.5 PRESENCE OF CORONARY ANGIOPLASTY IMPLANT AND GRAFT: ICD-10-CM

## 2024-09-30 PROCEDURE — G2211 COMPLEX E/M VISIT ADD ON: CPT

## 2024-09-30 PROCEDURE — 99214 OFFICE O/P EST MOD 30 MIN: CPT

## 2024-09-30 PROCEDURE — 93000 ELECTROCARDIOGRAM COMPLETE: CPT

## 2024-09-30 NOTE — ASSESSMENT
[FreeTextEntry1] : CAD, s/p PCI of LAD with DESIREE. Now s/p PCI of OM Had a sleep test - diagnosed with GEO. Using CPAP. Head CT and f/u head MRI noted, results from ER here as well as [facility] reviewed.  CT chest noted. Follow-up with pulmonary, ENT. Gained weight. Type II DM - A1c 6.7%. Did not tolerate Rybelsus. Lipid control discussed. C/w statin, increase Omega 3. Needs weight loss.  C/w valsartan 160 mg. Reports normal BP with this. If still elevated BP, increase to 320 mg  Quit smoking over 2 years - encouraged to stay off tobacco products. Secondary prevention discussed.    F/u in 6 months, or if any symptoms.

## 2024-09-30 NOTE — REASON FOR VISIT
[Symptom and Test Evaluation] : symptom and test evaluation [Coronary Artery Disease] : coronary artery disease [CV Risk Factors and Non-Cardiac Disease] : CV risk factors and non-cardiac disease

## 2024-09-30 NOTE — HISTORY OF PRESENT ILLNESS
[FreeTextEntry1] : 55 y/o male with history of HTN, DL, CAD, s/p PCI of LAD in February of 2014,  S/p NSTEMI, and is s/p PCI of OM with DESIREE by Dr. Brink. Since the last visit, the patient reports no chest pain, mild dyspnea, no orthopnea or PND. Unable to loose weight.  He quit smoking after the MI and has been tobacco free  x  4  years   Was seen by pulmonary, was diagnosed with GEO, started on CPAP. Also has interstitial lung disease on CT chest, being w/u for interstitial pneumonitis. Followed  with pulmonary has been stable . Pt has  stent in LAD. Distal LAD moderate lesion. OM - severe lesion, successfully stented with DESIREE.  S/p ER visit for head trauma. F/U  MRI of the brain was done and was negative. b/p has been elevated at home  Losartan was d/yasmani by PMD.  Started on Valsartan.  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 Unable to loose weight, still 280 lbs.   09/01/23  Exercise Stress Echo EF 62% Achieved 10.3 METs, Target Heat Rate wasn't achieved . No ischemia at submaximal HR  Grade I diastolic dysfunction  09/23/24 Chol 118 Trig 171 LDL 61 HgbA1C 6.7

## 2024-10-11 ENCOUNTER — NON-APPOINTMENT (OUTPATIENT)
Age: 54
End: 2024-10-11

## 2024-10-28 ENCOUNTER — APPOINTMENT (OUTPATIENT)
Dept: PULMONOLOGY | Facility: CLINIC | Age: 54
End: 2024-10-28

## 2025-04-22 ENCOUNTER — NON-APPOINTMENT (OUTPATIENT)
Age: 55
End: 2025-04-22

## 2025-04-22 ENCOUNTER — APPOINTMENT (OUTPATIENT)
Dept: CARDIOLOGY | Facility: CLINIC | Age: 55
End: 2025-04-22
Payer: COMMERCIAL

## 2025-04-22 VITALS
SYSTOLIC BLOOD PRESSURE: 130 MMHG | HEART RATE: 64 BPM | BODY MASS INDEX: 39.99 KG/M2 | DIASTOLIC BLOOD PRESSURE: 82 MMHG | HEIGHT: 69 IN | WEIGHT: 270 LBS

## 2025-04-22 DIAGNOSIS — I10 ESSENTIAL (PRIMARY) HYPERTENSION: ICD-10-CM

## 2025-04-22 DIAGNOSIS — I25.10 ATHEROSCLEROTIC HEART DISEASE OF NATIVE CORONARY ARTERY W/OUT ANGINA PECTORIS: ICD-10-CM

## 2025-04-22 DIAGNOSIS — E78.5 HYPERLIPIDEMIA, UNSPECIFIED: ICD-10-CM

## 2025-04-22 DIAGNOSIS — E11.9 TYPE 2 DIABETES MELLITUS W/OUT COMPLICATIONS: ICD-10-CM

## 2025-04-22 PROCEDURE — G2211 COMPLEX E/M VISIT ADD ON: CPT

## 2025-04-22 PROCEDURE — 93000 ELECTROCARDIOGRAM COMPLETE: CPT

## 2025-04-22 PROCEDURE — 99214 OFFICE O/P EST MOD 30 MIN: CPT

## 2025-04-29 ENCOUNTER — NON-APPOINTMENT (OUTPATIENT)
Age: 55
End: 2025-04-29

## 2025-05-02 ENCOUNTER — RESULT REVIEW (OUTPATIENT)
Age: 55
End: 2025-05-02

## 2025-05-02 ENCOUNTER — OUTPATIENT (OUTPATIENT)
Dept: OUTPATIENT SERVICES | Facility: HOSPITAL | Age: 55
LOS: 1 days | End: 2025-05-02
Payer: COMMERCIAL

## 2025-05-02 DIAGNOSIS — Z95.5 PRESENCE OF CORONARY ANGIOPLASTY IMPLANT AND GRAFT: ICD-10-CM

## 2025-05-02 DIAGNOSIS — Z90.49 ACQUIRED ABSENCE OF OTHER SPECIFIED PARTS OF DIGESTIVE TRACT: Chronic | ICD-10-CM

## 2025-05-02 DIAGNOSIS — E11.9 TYPE 2 DIABETES MELLITUS WITHOUT COMPLICATIONS: ICD-10-CM

## 2025-05-02 DIAGNOSIS — Z95.5 PRESENCE OF CORONARY ANGIOPLASTY IMPLANT AND GRAFT: Chronic | ICD-10-CM

## 2025-05-02 PROCEDURE — 78452 HT MUSCLE IMAGE SPECT MULT: CPT | Mod: 26

## 2025-05-02 PROCEDURE — A9500: CPT

## 2025-05-02 PROCEDURE — 78452 HT MUSCLE IMAGE SPECT MULT: CPT

## 2025-05-02 PROCEDURE — 93018 CV STRESS TEST I&R ONLY: CPT

## 2025-05-03 DIAGNOSIS — E11.9 TYPE 2 DIABETES MELLITUS WITHOUT COMPLICATIONS: ICD-10-CM

## 2025-05-03 DIAGNOSIS — Z95.5 PRESENCE OF CORONARY ANGIOPLASTY IMPLANT AND GRAFT: ICD-10-CM

## 2025-07-21 ENCOUNTER — NON-APPOINTMENT (OUTPATIENT)
Age: 55
End: 2025-07-21

## 2025-07-21 ENCOUNTER — RESULT CHARGE (OUTPATIENT)
Age: 55
End: 2025-07-21

## 2025-07-21 ENCOUNTER — APPOINTMENT (OUTPATIENT)
Dept: CARDIOLOGY | Facility: CLINIC | Age: 55
End: 2025-07-21
Payer: COMMERCIAL

## 2025-07-21 VITALS
WEIGHT: 274 LBS | DIASTOLIC BLOOD PRESSURE: 72 MMHG | HEART RATE: 65 BPM | BODY MASS INDEX: 40.58 KG/M2 | SYSTOLIC BLOOD PRESSURE: 122 MMHG | HEIGHT: 69 IN

## 2025-07-21 DIAGNOSIS — E11.9 TYPE 2 DIABETES MELLITUS W/OUT COMPLICATIONS: ICD-10-CM

## 2025-07-21 DIAGNOSIS — Z95.5 PRESENCE OF CORONARY ANGIOPLASTY IMPLANT AND GRAFT: ICD-10-CM

## 2025-07-21 DIAGNOSIS — I10 ESSENTIAL (PRIMARY) HYPERTENSION: ICD-10-CM

## 2025-07-21 DIAGNOSIS — E78.5 HYPERLIPIDEMIA, UNSPECIFIED: ICD-10-CM

## 2025-07-21 DIAGNOSIS — E11.65 TYPE 2 DIABETES MELLITUS WITH HYPERGLYCEMIA: ICD-10-CM

## 2025-07-21 PROCEDURE — G2211 COMPLEX E/M VISIT ADD ON: CPT | Mod: NC

## 2025-07-21 PROCEDURE — 99214 OFFICE O/P EST MOD 30 MIN: CPT

## 2025-07-21 PROCEDURE — 93000 ELECTROCARDIOGRAM COMPLETE: CPT

## 2025-07-31 RX ORDER — EVOLOCUMAB 140 MG/ML
140 INJECTION, SOLUTION SUBCUTANEOUS
Qty: 6 | Refills: 3 | Status: ACTIVE | COMMUNITY
Start: 2025-07-21 | End: 1900-01-01

## 2025-07-31 RX ORDER — ATORVASTATIN CALCIUM 10 MG/1
10 TABLET, FILM COATED ORAL DAILY
Qty: 30 | Refills: 3 | Status: ACTIVE | COMMUNITY
Start: 2025-07-31 | End: 1900-01-01

## 2025-08-20 ENCOUNTER — APPOINTMENT (OUTPATIENT)
Dept: CARDIOLOGY | Facility: CLINIC | Age: 55
End: 2025-08-20
Payer: COMMERCIAL

## 2025-08-20 VITALS
WEIGHT: 279 LBS | HEART RATE: 59 BPM | SYSTOLIC BLOOD PRESSURE: 132 MMHG | HEIGHT: 69 IN | BODY MASS INDEX: 41.32 KG/M2 | DIASTOLIC BLOOD PRESSURE: 81 MMHG

## 2025-08-20 DIAGNOSIS — Z71.3 DIETARY COUNSELING AND SURVEILLANCE: ICD-10-CM

## 2025-08-20 DIAGNOSIS — E66.9 OBESITY, UNSPECIFIED: ICD-10-CM

## 2025-08-20 DIAGNOSIS — E11.9 TYPE 2 DIABETES MELLITUS W/OUT COMPLICATIONS: ICD-10-CM

## 2025-08-20 DIAGNOSIS — Z71.82 EXERCISE COUNSELING: ICD-10-CM

## 2025-08-20 PROCEDURE — 99204 OFFICE O/P NEW MOD 45 MIN: CPT

## 2025-08-20 RX ORDER — TIRZEPATIDE 2.5 MG/.5ML
2.5 INJECTION, SOLUTION SUBCUTANEOUS
Qty: 4 | Refills: 0 | Status: ACTIVE | COMMUNITY
Start: 2025-08-20 | End: 1900-01-01

## 2025-08-28 PROBLEM — Z71.82 EXERCISE COUNSELING: Status: ACTIVE | Noted: 2025-08-28

## 2025-08-28 PROBLEM — Z71.3 DIETARY COUNSELING: Status: ACTIVE | Noted: 2025-08-28

## 2025-09-03 RX ORDER — SEMAGLUTIDE 0.68 MG/ML
2 INJECTION, SOLUTION SUBCUTANEOUS
Qty: 4 | Refills: 0 | Status: ACTIVE | COMMUNITY
Start: 2025-09-02 | End: 1900-01-01